# Patient Record
Sex: MALE | Race: WHITE | NOT HISPANIC OR LATINO | Employment: FULL TIME | URBAN - METROPOLITAN AREA
[De-identification: names, ages, dates, MRNs, and addresses within clinical notes are randomized per-mention and may not be internally consistent; named-entity substitution may affect disease eponyms.]

---

## 2019-02-19 PROBLEM — R07.81 RIB PAIN: Status: ACTIVE | Noted: 2019-02-19

## 2019-02-19 PROBLEM — M54.6 THORACIC SPINE PAIN: Status: ACTIVE | Noted: 2019-02-19

## 2020-01-03 PROBLEM — D84.9 IMMUNOCOMPROMISED (HCC): Status: ACTIVE | Noted: 2018-05-09

## 2020-01-03 PROBLEM — C90.01 MULTIPLE MYELOMA IN REMISSION (HCC): Status: ACTIVE | Noted: 2020-01-03

## 2020-01-03 PROBLEM — D70.9 NEUTROPENIA (HCC): Status: ACTIVE | Noted: 2018-05-09

## 2020-01-03 PROBLEM — C90.00 MULTIPLE MYELOMA (HCC): Status: ACTIVE | Noted: 2018-01-16

## 2020-01-03 PROBLEM — D63.0 ANEMIA IN NEOPLASTIC DISEASE: Status: ACTIVE | Noted: 2018-05-09

## 2020-01-03 PROBLEM — Z94.81 STATUS POST AUTOLOGOUS BONE MARROW TRANSPLANT (HCC): Status: ACTIVE | Noted: 2018-07-05

## 2020-03-15 ENCOUNTER — OFFICE VISIT (OUTPATIENT)
Dept: URGENT CARE | Facility: CLINIC | Age: 70
End: 2020-03-15
Payer: MEDICARE

## 2020-03-15 ENCOUNTER — HOSPITAL ENCOUNTER (OUTPATIENT)
Facility: MEDICAL CENTER | Age: 70
End: 2020-03-15
Attending: NURSE PRACTITIONER
Payer: MEDICARE

## 2020-03-15 VITALS — TEMPERATURE: 99.1 F | OXYGEN SATURATION: 95 % | HEART RATE: 79 BPM

## 2020-03-15 DIAGNOSIS — R05.9 COUGH: ICD-10-CM

## 2020-03-15 PROCEDURE — 87633 RESP VIRUS 12-25 TARGETS: CPT

## 2020-03-15 PROCEDURE — 99214 OFFICE O/P EST MOD 30 MIN: CPT | Performed by: NURSE PRACTITIONER

## 2020-03-15 PROCEDURE — 87581 M.PNEUMON DNA AMP PROBE: CPT

## 2020-03-15 PROCEDURE — 87486 CHLMYD PNEUM DNA AMP PROBE: CPT

## 2020-03-15 PROCEDURE — 87502 INFLUENZA DNA AMP PROBE: CPT | Mod: XU

## 2020-03-15 ASSESSMENT — ENCOUNTER SYMPTOMS
CHILLS: 0
FEVER: 1
DIARRHEA: 0
SORE THROAT: 0
MYALGIAS: 1
NAUSEA: 0
WHEEZING: 0
COUGH: 1
SPUTUM PRODUCTION: 0
SHORTNESS OF BREATH: 0
VOMITING: 0

## 2020-03-15 NOTE — PROGRESS NOTES
Subjective:   Mathew Alexis is a 70 y.o. male who presents for Cough (ISO traveled to [place]) and Fever        Cough   This is a new problem. The current episode started in the past 7 days (Started 2 days ago). The problem has been unchanged. The cough is non-productive. Associated symptoms include a fever, myalgias, nasal congestion and postnasal drip. Pertinent negatives include no chest pain, chills, rash, sore throat, shortness of breath or wheezing. He has tried OTC cough suppressant for the symptoms. The treatment provided mild relief. There is no history of asthma or pneumonia.    Recently returned from [place] 2 days ago. Symptoms started 2 days ago.  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    Review of Systems   Constitutional: Positive for fever and malaise/fatigue. Negative for chills.   HENT: Positive for congestion and postnasal drip. Negative for sore throat.    Respiratory: Positive for cough. Negative for sputum production, shortness of breath and wheezing.    Cardiovascular: Negative for chest pain.   Gastrointestinal: Negative for diarrhea, nausea and vomiting.   Musculoskeletal: Positive for myalgias.   Skin: Negative for rash.     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 He also has no past medical history of Addisons disease ([facility]), Adrenal disorder ([facility]), Allergy, Anxiety, Arrhythmia, Arthritis, Asthma, Blood transfusion without reported diagnosis, Cataract, Clotting disorder ([facility]), COPD (chronic obstructive pulmonary disease) ([facility]), Cushings syndrome ([facility]), Depression, Diabetes ([facility]), Diabetic neuropathy ([facility]), GERD (gastroesophageal reflux disease), Glaucoma, Goiter, Head ache, Heart attack ([facility]), Heart murmur, HIV (human immunodeficiency virus infection) ([facility]), Hyperlipidemia, Hypertension, IBD (inflammatory bowel disease), Kidney disease, Meningitis, Migraine, Muscle disorder, Osteoporosis,  Parathyroid disorder (HCC), Pituitary disease (HCC), Pulmonary emphysema (HCC), Seizure (HCC), Sickle cell disease (HCC), Stroke (HCC), Substance abuse (HCC), Thyroid disease, Tuberculosis, or Urinary tract infection.  ALLERGIES: No Known Allergies    Patient's PMH, SocHx, SurgHx, FamHx, Drug allergies and medications reviewed.     Objective:   Pulse 79   Temp 37.3 °C (99.1 °F) (Oral)   SpO2 95%   Physical Exam    Physical Exam  __Vitals signs reviewed.   ____Constitutional:       Appearance: He is well-developed.   ___HENT:      Head: Normocephalic.      Nose: Nose normal.      Mouth/Throat:      Lips: Pink.      Mouth: Mucous membranes are moist.   ___Eyes:      General: Lids are normal.      Extraocular Movements: Extraocular movements intact.      Conjunctiva/sclera: Conjunctivae normal.   ___Neck:      Musculoskeletal: Normal range of motion.   ___Cardiovascular:      Rate and Rhythm: Normal rate and regular rhythm.      Heart sounds: Normal heart sounds.   ___Pulmonary:      Effort: Pulmonary effort is normal. No tachypnea, bradypnea, accessory muscle usage, prolonged expiration or respiratory distress.      Breath sounds: Normal breath sounds. No wheezing.   Skin:     General: Skin is warm.   __Neurological:      Mental Status: He is alert and oriented to person, place, and time.   ___Psychiatric:         Mood and Affect: Mood normal.         Speech: Speech normal.         Behavior: Behavior normal. Behavior is cooperative.         Thought Content: Thought content normal.         Judgment: Judgment normal.  Assessment/Plan:   Assessment    1. Cough  POCT Influenza A/B    Influenza A/B By PCR (Adult - Flu Only)     Flu negative - patient notified of results  Testing and exam were performed outside from motor vehicle due to positive travel history.  Patient advised to self quarantine at home until [facility] has contacted and cleared patient.  Strict ER precautions  discussed    Differential diagnosis, natural history, supportive care, and indications for immediate follow-up discussed.     **Please note that all invasive procedures during this visit were performed by myself and/or the Medical Assistant under the supervision of the PA or MD in office**

## 2020-03-16 LAB
C PNEUM DNA SPEC QL NAA+PROBE: NOT DETECTED
FLUAV H1 2009 PAND RNA SPEC QL NAA+PROBE: NOT DETECTED
FLUAV H1 RNA SPEC QL NAA+PROBE: NOT DETECTED
FLUAV H3 RNA SPEC QL NAA+PROBE: NOT DETECTED
FLUAV RNA SPEC QL NAA+PROBE: NEGATIVE
FLUAV RNA SPEC QL NAA+PROBE: NOT DETECTED
FLUBV RNA SPEC QL NAA+PROBE: NEGATIVE
FLUBV RNA SPEC QL NAA+PROBE: NOT DETECTED
HADV DNA SPEC QL NAA+PROBE: NOT DETECTED
HCOV RNA SPEC QL NAA+PROBE: NOT DETECTED
HMPV RNA SPEC QL NAA+PROBE: NOT DETECTED
HPIV1 RNA SPEC QL NAA+PROBE: NOT DETECTED
HPIV2 RNA SPEC QL NAA+PROBE: NOT DETECTED
HPIV3 RNA SPEC QL NAA+PROBE: NOT DETECTED
HPIV4 RNA SPEC QL NAA+PROBE: NOT DETECTED
M PNEUMO DNA SPEC QL NAA+PROBE: NOT DETECTED
RSV A RNA SPEC QL NAA+PROBE: NOT DETECTED
RSV B RNA SPEC QL NAA+PROBE: NOT DETECTED
RV+EV RNA SPEC QL NAA+PROBE: DETECTED

## 2020-03-17 ENCOUNTER — TELEPHONE (OUTPATIENT)
Dept: URGENT CARE | Facility: CLINIC | Age: 70
End: 2020-03-17

## 2020-03-17 NOTE — TELEPHONE ENCOUNTER
Patient is returning your call. I asked him to keep his phone in hand so he can answer your call. He has more questions

## 2020-03-17 NOTE — TELEPHONE ENCOUNTER
Called and left detailed voicemail with positive Rhinovirus/Enterovirus results - viral infection. Advised that quarantine has been lifted due to positive results. Advised to return phone call for confirmation that he has received this message.   Good

## 2020-03-19 ENCOUNTER — TELEPHONE (OUTPATIENT)
Dept: URGENT CARE | Facility: CLINIC | Age: 70
End: 2020-03-19

## 2020-03-19 NOTE — TELEPHONE ENCOUNTER
Called and spoke to patient regarding and addressed his concerns at this time. Discussed that make returned to work once cough and fever have resolved.

## 2020-03-22 ENCOUNTER — OFFICE VISIT (OUTPATIENT)
Dept: URGENT CARE | Facility: CLINIC | Age: 70
End: 2020-03-22
Payer: MEDICARE

## 2020-03-22 VITALS
HEIGHT: 66 IN | RESPIRATION RATE: 18 BRPM | DIASTOLIC BLOOD PRESSURE: 60 MMHG | TEMPERATURE: 98.1 F | WEIGHT: 150.6 LBS | HEART RATE: 67 BPM | BODY MASS INDEX: 24.2 KG/M2 | OXYGEN SATURATION: 95 % | SYSTOLIC BLOOD PRESSURE: 116 MMHG

## 2020-03-22 DIAGNOSIS — J06.9 VIRAL URI WITH COUGH: ICD-10-CM

## 2020-03-22 PROCEDURE — 99213 OFFICE O/P EST LOW 20 MIN: CPT | Performed by: NURSE PRACTITIONER

## 2020-03-22 ASSESSMENT — ENCOUNTER SYMPTOMS
SPUTUM PRODUCTION: 1
FEVER: 0
CHILLS: 0
COUGH: 1
SHORTNESS OF BREATH: 1

## 2020-03-22 NOTE — PROGRESS NOTES
Subjective:      Mathew Alexis is a 70 y.o. male who presents with Cough (x1wk, productive cough)    Past Medical History:   Diagnosis Date   • Anemia in neoplastic disease 5/9/2018   • Cancer (HCC)    • No known health problems      Social History     Socioeconomic History   • Marital status:      Spouse name: Not on file   • Number of children: Not on file   • Years of education: Not on file   • Highest education level: Not on file   Occupational History   • Not on file   Social Needs   • Financial resource strain: Not on file   • Food insecurity     Worry: Not on file     Inability: Not on file   • Transportation needs     Medical: Not on file     Non-medical: Not on file   Tobacco Use   • Smoking status: Never Smoker   • Smokeless tobacco: Never Used   Substance and Sexual Activity   • Alcohol use: Yes   • Drug use: Never   • Sexual activity: Not on file   Lifestyle   • Physical activity     Days per week: Not on file     Minutes per session: Not on file   • Stress: Not on file   Relationships   • Social connections     Talks on phone: Not on file     Gets together: Not on file     Attends [employer]ism service: Not on file     Active member of club or organization: Not on file     Attends meetings of clubs or organizations: Not on file     Relationship status: Not on file   • Intimate partner violence     Fear of current or ex partner: Not on file     Emotionally abused: Not on file     Physically abused: Not on file     Forced sexual activity: Not on file   Other Topics Concern   • Not on file   Social History Narrative   • Not on file     Family History   Problem Relation Age of Onset   • Cancer Father      Patient has no known allergies.    Patient comes into the office with complaint of continued cough.  Total days elapsed since onset of symptoms is 9 days.  Patient was seen by urgent care on 3/15.  Nasal swab PCR done at that time was positive for enterovirus.  Patient contacted the provider who saw  "him at his original office visit and advised her that he is continuing to have an intense productive cough, and patient was advised to come back to urgent care for reevaluation.  Patient denies fever, body aches, or chills.  No shortness of breath.  States cough has been intermittently productive.  Overall, states he is feeling well.  States he is eating and drinking well and has a good appetite.  He is supposed to go back to work on Tuesday, March 24 and is concerned that he may not be able to return on that date.        Cough   This is a new problem. The current episode started 1 to 4 weeks ago. The problem has been unchanged. The problem occurs every few minutes. The cough is productive of sputum. Associated symptoms include nasal congestion, postnasal drip and shortness of breath. Pertinent negatives include no chills or fever. Nothing aggravates the symptoms. He has tried nothing for the symptoms. The treatment provided no relief.       Review of Systems   Constitutional: Positive for malaise/fatigue. Negative for chills and fever.   HENT: Positive for postnasal drip.    Respiratory: Positive for cough, sputum production and shortness of breath.    Skin: Negative.    All other systems reviewed and are negative.         Objective:     /60 (BP Location: Left arm, Patient Position: Sitting, BP Cuff Size: Adult)   Pulse 67   Temp 36.7 °C (98.1 °F) (Temporal)   Resp 18   Ht 1.676 m (5' 6\")   Wt 68.3 kg (150 lb 9.6 oz)   SpO2 95%   BMI 24.31 kg/m²      Physical Exam  Vitals signs reviewed.   Constitutional:       Appearance: Normal appearance.   HENT:      Right Ear: Tympanic membrane, ear canal and external ear normal.      Left Ear: Tympanic membrane, ear canal and external ear normal.      Nose: Congestion present.      Mouth/Throat:      Mouth: Mucous membranes are moist.   Eyes:      Extraocular Movements: Extraocular movements intact.      Conjunctiva/sclera: Conjunctivae normal.      Pupils: " Pupils are equal, round, and reactive to light.   Neck:      Musculoskeletal: Normal range of motion and neck supple.   Cardiovascular:      Rate and Rhythm: Normal rate and regular rhythm.      Heart sounds: Normal heart sounds.   Pulmonary:      Effort: Pulmonary effort is normal. No respiratory distress.      Breath sounds: Normal breath sounds. No stridor. No wheezing, rhonchi or rales.   Chest:      Chest wall: No tenderness.   Musculoskeletal: Normal range of motion.   Skin:     General: Skin is warm and dry.      Capillary Refill: Capillary refill takes less than 2 seconds.   Neurological:      Mental Status: He is alert and oriented to person, place, and time.   Psychiatric:         Mood and Affect: Mood normal.         Behavior: Behavior normal.         Thought Content: Thought content normal.         Judgment: Judgment normal.       Patient declined chest x-ray at this time, also declines prescription for cough medication or inhaler.          Assessment/Plan:   Upper respiratory infection  Cough    Over-the-counter cough medication of choice  Return to urgent care for persistent or worsening of symptoms, or any further questions or concerns  Patient advised to refrain from going back to work until symptoms have resolved x72 hours.    There are no diagnoses linked to this encounter.

## 2020-07-28 ENCOUNTER — HOSPITAL ENCOUNTER (OUTPATIENT)
Facility: MEDICAL CENTER | Age: 70
End: 2020-07-29
Attending: EMERGENCY MEDICINE | Admitting: HOSPITALIST
Payer: MEDICARE

## 2020-07-28 ENCOUNTER — APPOINTMENT (OUTPATIENT)
Dept: RADIOLOGY | Facility: MEDICAL CENTER | Age: 70
End: 2020-07-28
Attending: EMERGENCY MEDICINE
Payer: MEDICARE

## 2020-07-28 DIAGNOSIS — R74.01 TRANSAMINITIS: ICD-10-CM

## 2020-07-28 DIAGNOSIS — R10.9 ABDOMINAL PAIN, UNSPECIFIED ABDOMINAL LOCATION: ICD-10-CM

## 2020-07-28 DIAGNOSIS — D72.819 LEUKOPENIA, UNSPECIFIED TYPE: ICD-10-CM

## 2020-07-28 DIAGNOSIS — C90.00 MULTIPLE MYELOMA NOT HAVING ACHIEVED REMISSION (HCC): ICD-10-CM

## 2020-07-28 PROBLEM — K81.9 CHOLECYSTITIS: Status: ACTIVE | Noted: 2020-07-28

## 2020-07-28 PROBLEM — I10 HYPERTENSION: Status: ACTIVE | Noted: 2020-07-28

## 2020-07-28 LAB
ALBUMIN SERPL BCP-MCNC: 4.4 G/DL (ref 3.2–4.9)
ALBUMIN/GLOB SERPL: 1.7 G/DL
ALP SERPL-CCNC: 58 U/L (ref 30–99)
ALT SERPL-CCNC: 55 U/L (ref 2–50)
ANION GAP SERPL CALC-SCNC: 14 MMOL/L (ref 7–16)
APPEARANCE UR: CLEAR
AST SERPL-CCNC: 64 U/L (ref 12–45)
BACTERIA #/AREA URNS HPF: ABNORMAL /HPF
BASOPHILS # BLD AUTO: 0 % (ref 0–1.8)
BASOPHILS # BLD: 0 K/UL (ref 0–0.12)
BILIRUB SERPL-MCNC: 1.4 MG/DL (ref 0.1–1.5)
BILIRUB UR QL STRIP.AUTO: NEGATIVE
BUN SERPL-MCNC: 9 MG/DL (ref 8–22)
CALCIUM SERPL-MCNC: 9 MG/DL (ref 8.5–10.5)
CHLORIDE SERPL-SCNC: 94 MMOL/L (ref 96–112)
CO2 SERPL-SCNC: 24 MMOL/L (ref 20–33)
COLOR UR: YELLOW
COVID ORDER STATUS COVID19: NORMAL
CREAT SERPL-MCNC: 0.77 MG/DL (ref 0.5–1.4)
EOSINOPHIL # BLD AUTO: 0.06 K/UL (ref 0–0.51)
EOSINOPHIL NFR BLD: 3.5 % (ref 0–6.9)
EPI CELLS #/AREA URNS HPF: ABNORMAL /HPF
ERYTHROCYTE [DISTWIDTH] IN BLOOD BY AUTOMATED COUNT: 45.9 FL (ref 35.9–50)
GLOBULIN SER CALC-MCNC: 2.6 G/DL (ref 1.9–3.5)
GLUCOSE SERPL-MCNC: 115 MG/DL (ref 65–99)
GLUCOSE UR STRIP.AUTO-MCNC: NEGATIVE MG/DL
HCT VFR BLD AUTO: 44 % (ref 42–52)
HGB BLD-MCNC: 15.1 G/DL (ref 14–18)
HYALINE CASTS #/AREA URNS LPF: ABNORMAL /LPF
KETONES UR STRIP.AUTO-MCNC: 40 MG/DL
LEUKOCYTE ESTERASE UR QL STRIP.AUTO: NEGATIVE
LYMPHOCYTES # BLD AUTO: 0.26 K/UL (ref 1–4.8)
LYMPHOCYTES NFR BLD: 16.5 % (ref 22–41)
MANUAL DIFF BLD: ABNORMAL
MCH RBC QN AUTO: 32.5 PG (ref 27–33)
MCHC RBC AUTO-ENTMCNC: 34.3 G/DL (ref 33.7–35.3)
MCV RBC AUTO: 94.8 FL (ref 81.4–97.8)
MICRO URNS: ABNORMAL
MONOCYTES # BLD AUTO: 0.14 K/UL (ref 0–0.85)
MONOCYTES NFR BLD AUTO: 8.7 % (ref 0–13.4)
MORPHOLOGY BLD-IMP: NORMAL
MUCOUS THREADS #/AREA URNS HPF: ABNORMAL /HPF
NEUTROPHILS # BLD AUTO: 1.14 K/UL (ref 1.82–7.42)
NEUTROPHILS NFR BLD: 60.9 % (ref 44–72)
NEUTS BAND NFR BLD MANUAL: 10.4 % (ref 0–10)
NITRITE UR QL STRIP.AUTO: NEGATIVE
NRBC # BLD AUTO: 0 K/UL
NRBC BLD-RTO: 0 /100 WBC
PH UR STRIP.AUTO: 5.5 [PH] (ref 5–8)
PLATELET # BLD AUTO: 63 K/UL (ref 164–446)
PLATELET BLD QL SMEAR: NORMAL
PMV BLD AUTO: 9.8 FL (ref 9–12.9)
POTASSIUM SERPL-SCNC: 3.9 MMOL/L (ref 3.6–5.5)
PROT SERPL-MCNC: 7 G/DL (ref 6–8.2)
PROT UR QL STRIP: 100 MG/DL
RBC # BLD AUTO: 4.64 M/UL (ref 4.7–6.1)
RBC # URNS HPF: ABNORMAL /HPF
RBC BLD AUTO: NORMAL
RBC UR QL AUTO: ABNORMAL
SARS-COV-2 RDRP RESP QL NAA+PROBE: NOTDETECTED
SODIUM SERPL-SCNC: 132 MMOL/L (ref 135–145)
SP GR UR STRIP.AUTO: 1.02
SPECIMEN SOURCE: NORMAL
UROBILINOGEN UR STRIP.AUTO-MCNC: 0.2 MG/DL
WBC # BLD AUTO: 1.6 K/UL (ref 4.8–10.8)
WBC #/AREA URNS HPF: ABNORMAL /HPF

## 2020-07-28 PROCEDURE — 81001 URINALYSIS AUTO W/SCOPE: CPT

## 2020-07-28 PROCEDURE — 96365 THER/PROPH/DIAG IV INF INIT: CPT

## 2020-07-28 PROCEDURE — 85027 COMPLETE CBC AUTOMATED: CPT

## 2020-07-28 PROCEDURE — 700111 HCHG RX REV CODE 636 W/ 250 OVERRIDE (IP): Performed by: STUDENT IN AN ORGANIZED HEALTH CARE EDUCATION/TRAINING PROGRAM

## 2020-07-28 PROCEDURE — A9270 NON-COVERED ITEM OR SERVICE: HCPCS | Performed by: STUDENT IN AN ORGANIZED HEALTH CARE EDUCATION/TRAINING PROGRAM

## 2020-07-28 PROCEDURE — 700117 HCHG RX CONTRAST REV CODE 255: Performed by: EMERGENCY MEDICINE

## 2020-07-28 PROCEDURE — 96375 TX/PRO/DX INJ NEW DRUG ADDON: CPT | Mod: XU

## 2020-07-28 PROCEDURE — 99285 EMERGENCY DEPT VISIT HI MDM: CPT

## 2020-07-28 PROCEDURE — 700105 HCHG RX REV CODE 258: Performed by: STUDENT IN AN ORGANIZED HEALTH CARE EDUCATION/TRAINING PROGRAM

## 2020-07-28 PROCEDURE — 74177 CT ABD & PELVIS W/CONTRAST: CPT

## 2020-07-28 PROCEDURE — 80053 COMPREHEN METABOLIC PANEL: CPT

## 2020-07-28 PROCEDURE — 76705 ECHO EXAM OF ABDOMEN: CPT

## 2020-07-28 PROCEDURE — G0378 HOSPITAL OBSERVATION PER HR: HCPCS

## 2020-07-28 PROCEDURE — 700111 HCHG RX REV CODE 636 W/ 250 OVERRIDE (IP): Performed by: EMERGENCY MEDICINE

## 2020-07-28 PROCEDURE — 700102 HCHG RX REV CODE 250 W/ 637 OVERRIDE(OP): Performed by: STUDENT IN AN ORGANIZED HEALTH CARE EDUCATION/TRAINING PROGRAM

## 2020-07-28 PROCEDURE — 99220 PR INITIAL OBSERVATION CARE,LEVL III: CPT | Mod: GC | Performed by: HOSPITALIST

## 2020-07-28 PROCEDURE — C9803 HOPD COVID-19 SPEC COLLECT: HCPCS | Performed by: STUDENT IN AN ORGANIZED HEALTH CARE EDUCATION/TRAINING PROGRAM

## 2020-07-28 PROCEDURE — 96367 TX/PROPH/DG ADDL SEQ IV INF: CPT

## 2020-07-28 PROCEDURE — U0004 COV-19 TEST NON-CDC HGH THRU: HCPCS

## 2020-07-28 PROCEDURE — A9537 TC99M MEBROFENIN: HCPCS

## 2020-07-28 PROCEDURE — 96366 THER/PROPH/DIAG IV INF ADDON: CPT

## 2020-07-28 PROCEDURE — 85007 BL SMEAR W/DIFF WBC COUNT: CPT

## 2020-07-28 PROCEDURE — 700105 HCHG RX REV CODE 258: Performed by: EMERGENCY MEDICINE

## 2020-07-28 PROCEDURE — 96365 THER/PROPH/DIAG IV INF INIT: CPT | Mod: XU

## 2020-07-28 RX ORDER — BISACODYL 10 MG
10 SUPPOSITORY, RECTAL RECTAL
Status: DISCONTINUED | OUTPATIENT
Start: 2020-07-28 | End: 2020-07-29

## 2020-07-28 RX ORDER — SODIUM CHLORIDE 9 MG/ML
INJECTION, SOLUTION INTRAVENOUS CONTINUOUS
Status: DISCONTINUED | OUTPATIENT
Start: 2020-07-28 | End: 2020-07-29 | Stop reason: HOSPADM

## 2020-07-28 RX ORDER — LIDOCAINE HYDROCHLORIDE 10 MG/ML
20 INJECTION, SOLUTION INFILTRATION; PERINEURAL ONCE
Status: DISCONTINUED | OUTPATIENT
Start: 2020-07-28 | End: 2020-07-28 | Stop reason: CLARIF

## 2020-07-28 RX ORDER — ACETAMINOPHEN 325 MG/1
650 TABLET ORAL EVERY 6 HOURS PRN
Status: DISCONTINUED | OUTPATIENT
Start: 2020-07-28 | End: 2020-07-29

## 2020-07-28 RX ORDER — AMOXICILLIN 250 MG
2 CAPSULE ORAL 2 TIMES DAILY
Status: DISCONTINUED | OUTPATIENT
Start: 2020-07-28 | End: 2020-07-29

## 2020-07-28 RX ORDER — POLYETHYLENE GLYCOL 3350 17 G/17G
1 POWDER, FOR SOLUTION ORAL
Status: DISCONTINUED | OUTPATIENT
Start: 2020-07-28 | End: 2020-07-29

## 2020-07-28 RX ORDER — MORPHINE SULFATE 4 MG/ML
4 INJECTION, SOLUTION INTRAMUSCULAR; INTRAVENOUS ONCE
Status: COMPLETED | OUTPATIENT
Start: 2020-07-28 | End: 2020-07-28

## 2020-07-28 RX ORDER — SODIUM CHLORIDE, SODIUM LACTATE, POTASSIUM CHLORIDE, CALCIUM CHLORIDE 600; 310; 30; 20 MG/100ML; MG/100ML; MG/100ML; MG/100ML
1000 INJECTION, SOLUTION INTRAVENOUS ONCE
Status: COMPLETED | OUTPATIENT
Start: 2020-07-28 | End: 2020-07-28

## 2020-07-28 RX ORDER — SODIUM CHLORIDE 9 MG/ML
1000 INJECTION, SOLUTION INTRAVENOUS ONCE
Status: COMPLETED | OUTPATIENT
Start: 2020-07-28 | End: 2020-07-28

## 2020-07-28 RX ORDER — OXYCODONE HYDROCHLORIDE 5 MG/1
5 TABLET ORAL EVERY 4 HOURS PRN
Status: DISCONTINUED | OUTPATIENT
Start: 2020-07-28 | End: 2020-07-29

## 2020-07-28 RX ORDER — SODIUM CHLORIDE, SODIUM LACTATE, POTASSIUM CHLORIDE, CALCIUM CHLORIDE 600; 310; 30; 20 MG/100ML; MG/100ML; MG/100ML; MG/100ML
INJECTION, SOLUTION INTRAVENOUS CONTINUOUS
Status: DISCONTINUED | OUTPATIENT
Start: 2020-07-28 | End: 2020-07-28

## 2020-07-28 RX ADMIN — ACETAMINOPHEN 650 MG: 325 TABLET, FILM COATED ORAL at 17:50

## 2020-07-28 RX ADMIN — PIPERACILLIN AND TAZOBACTAM 4.5 G: 4; .5 INJECTION, POWDER, LYOPHILIZED, FOR SOLUTION INTRAVENOUS; PARENTERAL at 21:45

## 2020-07-28 RX ADMIN — MORPHINE SULFATE 4 MG: 4 INJECTION INTRAVENOUS at 09:44

## 2020-07-28 RX ADMIN — SODIUM CHLORIDE 1000 ML: 9 INJECTION, SOLUTION INTRAVENOUS at 13:08

## 2020-07-28 RX ADMIN — IOHEXOL 100 ML: 350 INJECTION, SOLUTION INTRAVENOUS at 08:15

## 2020-07-28 RX ADMIN — SODIUM CHLORIDE: 9 INJECTION, SOLUTION INTRAVENOUS at 14:12

## 2020-07-28 RX ADMIN — OXYCODONE 5 MG: 5 TABLET ORAL at 17:50

## 2020-07-28 RX ADMIN — CEFTRIAXONE SODIUM 1 G: 1 INJECTION, POWDER, FOR SOLUTION INTRAMUSCULAR; INTRAVENOUS at 11:25

## 2020-07-28 RX ADMIN — PIPERACILLIN AND TAZOBACTAM 4.5 G: 4; .5 INJECTION, POWDER, LYOPHILIZED, FOR SOLUTION INTRAVENOUS; PARENTERAL at 14:13

## 2020-07-28 RX ADMIN — SODIUM CHLORIDE, POTASSIUM CHLORIDE, SODIUM LACTATE AND CALCIUM CHLORIDE 1000 ML: 600; 310; 30; 20 INJECTION, SOLUTION INTRAVENOUS at 06:52

## 2020-07-28 RX ADMIN — SODIUM CHLORIDE, POTASSIUM CHLORIDE, SODIUM LACTATE AND CALCIUM CHLORIDE: 600; 310; 30; 20 INJECTION, SOLUTION INTRAVENOUS at 09:44

## 2020-07-28 RX ADMIN — OXYCODONE 5 MG: 5 TABLET ORAL at 21:12

## 2020-07-28 ASSESSMENT — PATIENT HEALTH QUESTIONNAIRE - PHQ9
2. FEELING DOWN, DEPRESSED, IRRITABLE, OR HOPELESS: NOT AT ALL
1. LITTLE INTEREST OR PLEASURE IN DOING THINGS: NOT AT ALL
SUM OF ALL RESPONSES TO PHQ9 QUESTIONS 1 AND 2: 0

## 2020-07-28 ASSESSMENT — ENCOUNTER SYMPTOMS
FEVER: 1
FLANK PAIN: 1
DIARRHEA: 0
WHEEZING: 0
NAUSEA: 0
BLOOD IN STOOL: 0
DOUBLE VISION: 0
CHILLS: 1
NAUSEA: 1
TINGLING: 0
CONSTIPATION: 1
SHORTNESS OF BREATH: 0
BLURRED VISION: 0
BACK PAIN: 1
PALPITATIONS: 0
WEAKNESS: 0
ABDOMINAL PAIN: 1
DIZZINESS: 0
SENSORY CHANGE: 0
BRUISES/BLEEDS EASILY: 0
DEPRESSION: 0
NERVOUS/ANXIOUS: 0
VOMITING: 0
HEADACHES: 0
COUGH: 0
NECK PAIN: 0
DIAPHORESIS: 0
MYALGIAS: 0
FLANK PAIN: 0
ORTHOPNEA: 0
FOCAL WEAKNESS: 0
NERVOUS/ANXIOUS: 1
WEIGHT LOSS: 0
SORE THROAT: 0
POLYDIPSIA: 0

## 2020-07-28 ASSESSMENT — LIFESTYLE VARIABLES
TOTAL SCORE: 0
HAVE PEOPLE ANNOYED YOU BY CRITICIZING YOUR DRINKING: NO
EVER HAD A DRINK FIRST THING IN THE MORNING TO STEADY YOUR NERVES TO GET RID OF A HANGOVER: NO
ALCOHOL_USE: NO
AVERAGE NUMBER OF DAYS PER WEEK YOU HAVE A DRINK CONTAINING ALCOHOL: 0
TOTAL SCORE: 0
TOTAL SCORE: 0
CONSUMPTION TOTAL: NEGATIVE
HAVE YOU EVER FELT YOU SHOULD CUT DOWN ON YOUR DRINKING: NO
EVER_SMOKED: NEVER
HOW MANY TIMES IN THE PAST YEAR HAVE YOU HAD 5 OR MORE DRINKS IN A DAY: 0
EVER FELT BAD OR GUILTY ABOUT YOUR DRINKING: NO
ON A TYPICAL DAY WHEN YOU DRINK ALCOHOL HOW MANY DRINKS DO YOU HAVE: 0

## 2020-07-28 ASSESSMENT — FIBROSIS 4 INDEX: FIB4 SCORE: 9.86

## 2020-07-28 NOTE — H&P
Internal Medicine Admitting History and Physical    Note Author: Max Ledezma M.D.       Name Mathew Alexis DOB 1950   Age/Sex 70 y.o. male   MRN [id number]   Code Status FULL     After 5PM or if no immediate response to page, please call for cross-coverage  Attending/Team: Abhi Rousseau See Patient List for primary contact information  Call [phone] to page    1st Call - Day Intern (R1):   Max Ledezma 2nd Call - Day Sr. Resident (R2/R3):   Raciel Rachel       Chief Complaint:   Abdominal pain    HPI:  Acute abdominal pain of the RUQ for 3 days, intense, sharp, non-radiating, 7-8/10. Originally thought to be related to chronic constipation, but after taking senna and having bowel movement, did not feel any relief; attempted to use Aleve also with no relief. Worsened with inspirations. Yesterday, began to feel worse, with new onset subjective fever and chills. This was new pain from chronic 6m abdominal pain related to constipation. Denies nausea/vomiting, hematochezia/hematuria, flank pain, itching sensation/skin changes.    History of Multiple myeloma, 2 years ago. Stem cell transplant at [facility], follows oncology with Dorcas Covarrubias at [facility] (call placed, partner--Jean bunch).    Review of Systems   Constitutional: Positive for chills and fever. Negative for weight loss.   HENT: Negative for congestion, hearing loss, nosebleeds, sore throat and tinnitus.    Eyes: Negative for blurred vision and double vision.   Respiratory: Negative for cough, shortness of breath and wheezing.    Cardiovascular: Negative for chest pain, orthopnea and leg swelling.   Gastrointestinal: Positive for abdominal pain and constipation. Negative for blood in stool, diarrhea, nausea and vomiting.   Genitourinary: Negative for dysuria, flank pain, frequency, hematuria and urgency.   Musculoskeletal: Negative for joint pain and myalgias.   Skin: Negative for itching and rash.    Neurological: Negative for dizziness, tingling, weakness and headaches.   Endo/Heme/Allergies: Negative for polydipsia.        No unexpected weight changes.   Psychiatric/Behavioral: Negative for depression. The patient is not nervous/anxious.              Past Medical History (Chronic medical problem, known complications and current treatment)    Multiple Myeloma, HTN     Past Surgical History:  Past Surgical History:   Procedure Laterality Date   • HERNIA REPAIR     • VASECTOMY     Stem cell transplant 2018 @ [facility]    Current Outpatient Medications:  Home Medications     Reviewed by Minerva Hicks PhT (Pharmacy Tech) on 07/28/20 at 1051  Med List Status: Complete   Medication Last Dose Status   Ascorbic Acid (VITAMIN C PO) 7/25/2020 Active   aspirin EC (ECOTRIN) 81 MG Tablet Delayed Response 7/25/2020 Active   Calcium-Magnesium (LENKA/MAG PO) 7/25/2020 Active   Cholecalciferol (VITAMIN D3 PO) 7/25/2020 Active   Lenalidomide (REVLIMID) 5 MG Cap 7/25/2020 Active   multivitamin (THERAGRAN) Tab 7/25/2020 Active   TURMERIC PO 7/25/2020 Active                Medication Allergy/Sensitivities:  No Known Allergies      Family History (mandatory)   Family History   Problem Relation Age of Onset   • Cancer Father    Father: Renal CA  Brother: arrythmia, unspecified  Sister: mitral valve replacement x2    Social History (mandatory)   Social History     Socioeconomic History   • Marital status:      Spouse name: Not on file   • Number of children: Not on file   • Years of education: Not on file   • Highest education level: Not on file   Occupational History   • Not on file   Social Needs   • Financial resource strain: Not on file   • Food insecurity     Worry: Not on file     Inability: Not on file   • Transportation needs     Medical: Not on file     Non-medical: Not on file   Tobacco Use   • Smoking status: Never Smoker   • Smokeless tobacco: Never Used   Substance and Sexual Activity   • Alcohol use: Yes   •  Drug use: Never   • Sexual activity: Not on file   Lifestyle   • Physical activity     Days per week: Not on file     Minutes per session: Not on file   • Stress: Not on file   Relationships   • Social connections     Talks on phone: Not on file     Gets together: Not on file     Attends Tenriism service: Not on file     Active member of club or organization: Not on file     Attends meetings of clubs or organizations: Not on file     Relationship status: Not on file   • Intimate partner violence     Fear of current or ex partner: Not on file     Emotionally abused: Not on file     Physically abused: Not on file     Forced sexual activity: Not on file   Other Topics Concern   • Not on file   Social History Narrative   • Not on file     Monthly MJ usage, 1-2beers/month, never smoker.    Living situation: [place] home with son; Apartment with girlfriend (preferred placement for discharge)  PCP : YVONNE Strauss    Physical Exam     Vitals:    07/28/20 0927 07/28/20 1050 07/28/20 1101 07/28/20 1201   BP: 132/71 137/68 120/57 127/66   Pulse: 78 67 67 76   Resp: 18      Temp:       TempSrc:       SpO2: 95% 97% 95% 95%   Weight:         Body mass index is 23.84 kg/m².  O2 therapy: Pulse Oximetry: 95 %, O2 Delivery Device: None - Room Air    Physical Exam   Constitutional: He is oriented to person, place, and time and well-developed, well-nourished, and in no distress.   HENT:   Head: Normocephalic and atraumatic.   Eyes: Pupils are equal, round, and reactive to light. EOM are normal.   Neck: Normal range of motion.   Cardiovascular: Normal rate and regular rhythm. Exam reveals no gallop and no friction rub.   No murmur heard.  Dorsalis, radial pulses 3+ bilaterally   Pulmonary/Chest: Breath sounds normal.   Abdominal:   Given morphine prior to abd exam.  Soft, tender in the RUQ, positive turcios. No rebound tenderness.   Musculoskeletal: Normal range of motion.      Comments: Shoulder flexion/extension 5/5  bilaterally. Knee flexion/extension 5/5 bilaterally.   Neurological: He is oriented to person, place, and time. He displays normal reflexes. No cranial nerve deficit.   Skin: Skin is warm and dry. He is not diaphoretic.   Psychiatric: Memory and judgment normal.         Data Review       Old Records Request:   Completed  Current Records review/summary: Completed    Lab Data Review:  Recent Results (from the past 24 hour(s))   CBC WITH DIFFERENTIAL    Collection Time: 07/28/20  6:29 AM   Result Value Ref Range    WBC 1.6 (LL) 4.8 - 10.8 K/uL    RBC 4.64 (L) 4.70 - 6.10 M/uL    Hemoglobin 15.1 14.0 - 18.0 g/dL    Hematocrit 44.0 42.0 - 52.0 %    MCV 94.8 81.4 - 97.8 fL    MCH 32.5 27.0 - 33.0 pg    MCHC 34.3 33.7 - 35.3 g/dL    RDW 45.9 35.9 - 50.0 fL    Platelet Count 63 (L) 164 - 446 K/uL    MPV 9.8 9.0 - 12.9 fL    Neutrophils-Polys 60.90 44.00 - 72.00 %    Lymphocytes 16.50 (L) 22.00 - 41.00 %    Monocytes 8.70 0.00 - 13.40 %    Eosinophils 3.50 0.00 - 6.90 %    Basophils 0.00 0.00 - 1.80 %    Nucleated RBC 0.00 /100 WBC    Neutrophils (Absolute) 1.14 (L) 1.82 - 7.42 K/uL    Lymphs (Absolute) 0.26 (L) 1.00 - 4.80 K/uL    Monos (Absolute) 0.14 0.00 - 0.85 K/uL    Eos (Absolute) 0.06 0.00 - 0.51 K/uL    Baso (Absolute) 0.00 0.00 - 0.12 K/uL    NRBC (Absolute) 0.00 K/uL   COMP METABOLIC PANEL    Collection Time: 07/28/20  6:29 AM   Result Value Ref Range    Sodium 132 (L) 135 - 145 mmol/L    Potassium 3.9 3.6 - 5.5 mmol/L    Chloride 94 (L) 96 - 112 mmol/L    Co2 24 20 - 33 mmol/L    Anion Gap 14.0 7.0 - 16.0    Glucose 115 (H) 65 - 99 mg/dL    Bun 9 8 - 22 mg/dL    Creatinine 0.77 0.50 - 1.40 mg/dL    Calcium 9.0 8.5 - 10.5 mg/dL    AST(SGOT) 64 (H) 12 - 45 U/L    ALT(SGPT) 55 (H) 2 - 50 U/L    Alkaline Phosphatase 58 30 - 99 U/L    Total Bilirubin 1.4 0.1 - 1.5 mg/dL    Albumin 4.4 3.2 - 4.9 g/dL    Total Protein 7.0 6.0 - 8.2 g/dL    Globulin 2.6 1.9 - 3.5 g/dL    A-G Ratio 1.7 g/dL   ESTIMATED GFR    Collection  Time: 07/28/20  6:29 AM   Result Value Ref Range    GFR If African American >60 >60 mL/min/1.73 m 2    GFR If Non African American >60 >60 mL/min/1.73 m 2   PERIPHERAL SMEAR REVIEW    Collection Time: 07/28/20  6:29 AM   Result Value Ref Range    Peripheral Smear Review see below    PLATELET ESTIMATE    Collection Time: 07/28/20  6:29 AM   Result Value Ref Range    Plt Estimation Decreased    MORPHOLOGY    Collection Time: 07/28/20  6:29 AM   Result Value Ref Range    RBC Morphology Normal    DIFFERENTIAL MANUAL    Collection Time: 07/28/20  6:29 AM   Result Value Ref Range    Bands-Stabs 10.40 (H) 0.00 - 10.00 %    Manual Diff Status PERFORMED    URINALYSIS CULTURE, IF INDICATED    Collection Time: 07/28/20  8:20 AM    Specimen: Urine, Clean Catch   Result Value Ref Range    Color Yellow     Character Clear     Specific Gravity 1.018 <1.035    Ph 5.5 5.0 - 8.0    Glucose Negative Negative mg/dL    Ketones 40 (A) Negative mg/dL    Protein 100 (A) Negative mg/dL    Bilirubin Negative Negative    Urobilinogen, Urine 0.2 Negative    Nitrite Negative Negative    Leukocyte Esterase Negative Negative    Occult Blood Small (A) Negative    Micro Urine Req Microscopic    URINE MICROSCOPIC (W/UA)    Collection Time: 07/28/20  8:20 AM   Result Value Ref Range    WBC 2-5 (A) /hpf    RBC 0-2 (A) /hpf    Bacteria Few (A) None /hpf    Epithelial Cells Few /hpf    Mucous Threads Few /hpf    Hyaline Cast 0-2 /lpf       Imaging/Procedures Review:    Independant Imaging Review: Completed  US-RUQ   Final Result      Gallbladder wall thickening can be seen in cholecystitis, hepatitis, cirrhosis and hypoproteinemia. There is trace pericholecystic fluid. No gallstones are identified.      No biliary ductal dilatation is seen.      Hepatic cyst.      Atherosclerotic plaque.      CT-ABDOMEN-PELVIS WITH   Final Result      Gallbladder wall thickening with pericholecystic fluid. There are inflammatory changes in the right upper quadrant.  Findings appear compatible with cholecystitis. Further evaluation can be obtained with right upper quadrant ultrasound.      Hypodense hepatic lesion likely represents a cyst. Tiny hypodense lesion in segment 4 is too small to characterize but may also represent a small cyst.      Moderate amount of colonic stool.      No evidence of acute appendicitis.      Left renal cyst.      Multiple compression fractures in the thoracic and lumbar spine.      Atherosclerotic plaque.      NM-BILIARY (HIDA) SCAN WITH CCK    (Results Pending)            EKG:   EKG Independent Review: Deferred; not indicated at this time    Records reviewed and summarized in current documentation :  Yes  [employer] teaching service handout given to patient:  No         Assessment/Plan     Cholecystitis  Assessment & Plan  Acute cholecystitis without calculus, 3 day history of increased abd pain, nausea. Presented to ED for abd pain and was found to have pericholecystic fluid, concerning for cholecystitis. Surgery consulted for cholecystectomy, who in turn requested oncology consult request regarding immunosuppressive state. Will likely undergo operation tomorrow.  -surgery following  -Oncology consulted, will see tomorrow  -HIDA scan  -NPO  -isopropyl pads for nausea  -Acetaminophen 650mg PRN q6h  -oxyco 5mg PRN q6h    Hypertension  Assessment & Plan  History of hypertension. Normotensive without medications. Normotensive at admission.  -VS q8h    Multiple myeloma in remission (HCC)- (present on admission)  Assessment & Plan  History of MM, post stem cell transplant. Has been on lenalidomide. Call placed to home oncology office--Elver CARDENAS, discussed potential secondary neoplastic processes due to lenalidomide, unlikely to be cause of current cholecysitis.  -Hold lenalidomide, may restart at discharge  -oncology consulted  -Follow up with Elver CARDENAS, for lenalidomide restart      Anticipated Hospital stay: Observation admit        Quality  Measures  Quality-Core Measures   Reviewed items::  Medications reviewed, Labs reviewed and Radiology images reviewed  Denny catheter::  No Denny  DVT prophylaxis - mechanical:  SCDs  Antibiotics:  Treating active infection/contamination beyond 24 hours perioperative coverage (zosyn)    PCP: YVONNE Strauss

## 2020-07-28 NOTE — ED NOTES
Tech from Lab called with critical result of WBC 1.6 at 0726. Critical lab result read back to Tech.   Dr. Hampton and primary RN notified of critical lab result at 0727.  Critical lab result read back by Dr. Hampton.

## 2020-07-28 NOTE — PROGRESS NOTES
Negative on covid test, update patient plan of care. For HIDA scan 4 pm today, kept NPO. Last meal was last night.

## 2020-07-28 NOTE — CARE PLAN
Problem: Communication  Goal: The ability to communicate needs accurately and effectively will improve  Outcome: MET     Problem: Safety  Goal: Will remain free from injury  Outcome: MET     Problem: Pain Management  Goal: Pain level will decrease to patient's comfort goal  Outcome: MET     Problem: Infection  Goal: Will remain free from infection  Outcome: MET

## 2020-07-28 NOTE — ASSESSMENT & PLAN NOTE
History of MM, post stem cell transplant. Has been on lenalidomide. Call placed to home oncology office--Elver CARDENAS, discussed potential secondary neoplastic processes due to lenalidomide, unlikely to be cause of current cholecysitis.  -Hold lenalidomide, may restart at discharge  -Follow up with Elver CARDENAS, for lenalidomide restart

## 2020-07-28 NOTE — ED PROVIDER NOTES
"ED Provider Note    ED Provider Note    Primary care provider: YVONNE Strauss  Means of arrival: POV  History obtained from: patient, wife  History limited by: None    CHIEF COMPLAINT  Chief Complaint   Patient presents with   • RLQ Pain     x 3 days, +rebound tenderness       HPI  Mathew Alexis is a 70 y.o. male who presents to the Emergency Department accompanied by his partner with a chief complaint of lower abdominal pain, primarily, right lower quadrant pain.  Patient underwent a stem cell transplant about 2 years ago.  He continues to be on maintenance therapy, REVLIMID,  for neoplastic disease.  About 6 months ago, he started developing constipation which would cause abdominal pain.  He has been able to manage this at home with Senokot tea.  He started feeling these similar symptoms over the weekend and had Senokot T on Saturday.  On Sunday, he passed over the course of the day, a large amount of stool and felt he was adequately \"cleaned out\".  However, the pain symptoms did not subside which typically they would.  He is also had associated nausea which is not typical of his symptoms he has had over the last 6 months.  He reports a fever of 102 last night at approximately 8 PM.  He denies any urinary symptoms.  No frequency or hematuria.  No blood in his stool.  He has not vomited.  No chest pain or shortness of breath.  No cough or cold symptoms.  He is having some pain to his right lateral abdominal area and right flank.     REVIEW OF SYSTEMS  Review of Systems   Constitutional: Positive for fever.   HENT: Negative for congestion.    Respiratory: Negative for cough and shortness of breath.    Cardiovascular: Negative for chest pain.   Gastrointestinal: Positive for abdominal pain, constipation and nausea. Negative for blood in stool and vomiting.   Genitourinary: Positive for flank pain. Negative for dysuria and hematuria.   Musculoskeletal: Positive for back pain.   Skin: Negative for rash. "   Neurological: Negative for dizziness and headaches.   All other systems reviewed and are negative.      PAST MEDICAL HISTORY   has a past medical history of Anemia in neoplastic disease (5/9/2018), Cancer (HCC), and No known health problems.    SURGICAL HISTORY   has a past surgical history that includes hernia repair and vasectomy.    SOCIAL HISTORY  Social History     Tobacco Use   • Smoking status: Never Smoker   • Smokeless tobacco: Never Used   Substance Use Topics   • Alcohol use: Yes   • Drug use: Never      Social History     Substance and Sexual Activity   Drug Use Never       FAMILY HISTORY  Family History   Problem Relation Age of Onset   • Cancer Father        CURRENT MEDICATIONS  Home Medications     Reviewed by Liat Blanca (Pharmacy Tech) on 07/28/20 at 1051  Med List Status: Complete   Medication Last Dose Status   Ascorbic Acid (VITAMIN C PO) 7/25/2020 Active   aspirin EC (ECOTRIN) 81 MG Tablet Delayed Response 7/25/2020 Active   Calcium-Magnesium (LENKA/MAG PO) 7/25/2020 Active   Cholecalciferol (VITAMIN D3 PO) 7/25/2020 Active   Lenalidomide (REVLIMID) 5 MG Cap 7/25/2020 Active   multivitamin (THERAGRAN) Tab 7/25/2020 Active   TURMERIC PO 7/25/2020 Active                ALLERGIES  No Known Allergies    PHYSICAL EXAM  VITAL SIGNS: /66   Pulse 76   Temp 36.1 °C (97 °F) (Temporal)   Resp 18   Wt 67 kg (147 lb 11.3 oz)   SpO2 95%   BMI 23.84 kg/m²   Vitals reviewed.  Constitutional: Patient is oriented to person, place, and time. Appears well-developed and well-nourished.  Mild distress.    Head: Normocephalic and atraumatic.   Ears: Normal external ears bilaterally.   Eyes: Conjunctivae are normal. Pupils are equal, round, and reactive to light.   Neck: Normal range of motion. Neck supple.  Cardiovascular: Normal rate, regular rhythm and normal heart sounds. Normal peripheral pulses.  Pulmonary/Chest: Effort normal and breath sounds normal. No respiratory distress, no wheezes,  rhonchi, or rales.  Abdominal: Soft. Bowel sounds are normal. There is pain across his lower abdomen, right greater than left.  No rebound or guarding, or peritoneal signs. No CVA tenderness.  Musculoskeletal: No edema and no tenderness.   Neurological: No focal deficits.   Skin: Skin is warm and dry. No erythema. No pallor.   Psychiatric: Patient has a normal mood and affect.     LABS  Results for orders placed or performed during the hospital encounter of 07/28/20   CBC WITH DIFFERENTIAL   Result Value Ref Range    WBC 1.6 (LL) 4.8 - 10.8 K/uL    RBC 4.64 (L) 4.70 - 6.10 M/uL    Hemoglobin 15.1 14.0 - 18.0 g/dL    Hematocrit 44.0 42.0 - 52.0 %    MCV 94.8 81.4 - 97.8 fL    MCH 32.5 27.0 - 33.0 pg    MCHC 34.3 33.7 - 35.3 g/dL    RDW 45.9 35.9 - 50.0 fL    Platelet Count 63 (L) 164 - 446 K/uL    MPV 9.8 9.0 - 12.9 fL    Neutrophils-Polys 60.90 44.00 - 72.00 %    Lymphocytes 16.50 (L) 22.00 - 41.00 %    Monocytes 8.70 0.00 - 13.40 %    Eosinophils 3.50 0.00 - 6.90 %    Basophils 0.00 0.00 - 1.80 %    Nucleated RBC 0.00 /100 WBC    Neutrophils (Absolute) 1.14 (L) 1.82 - 7.42 K/uL    Lymphs (Absolute) 0.26 (L) 1.00 - 4.80 K/uL    Monos (Absolute) 0.14 0.00 - 0.85 K/uL    Eos (Absolute) 0.06 0.00 - 0.51 K/uL    Baso (Absolute) 0.00 0.00 - 0.12 K/uL    NRBC (Absolute) 0.00 K/uL   COMP METABOLIC PANEL   Result Value Ref Range    Sodium 132 (L) 135 - 145 mmol/L    Potassium 3.9 3.6 - 5.5 mmol/L    Chloride 94 (L) 96 - 112 mmol/L    Co2 24 20 - 33 mmol/L    Anion Gap 14.0 7.0 - 16.0    Glucose 115 (H) 65 - 99 mg/dL    Bun 9 8 - 22 mg/dL    Creatinine 0.77 0.50 - 1.40 mg/dL    Calcium 9.0 8.5 - 10.5 mg/dL    AST(SGOT) 64 (H) 12 - 45 U/L    ALT(SGPT) 55 (H) 2 - 50 U/L    Alkaline Phosphatase 58 30 - 99 U/L    Total Bilirubin 1.4 0.1 - 1.5 mg/dL    Albumin 4.4 3.2 - 4.9 g/dL    Total Protein 7.0 6.0 - 8.2 g/dL    Globulin 2.6 1.9 - 3.5 g/dL    A-G Ratio 1.7 g/dL   URINALYSIS CULTURE, IF INDICATED    Specimen: Urine, Clean  Catch   Result Value Ref Range    Color Yellow     Character Clear     Specific Gravity 1.018 <1.035    Ph 5.5 5.0 - 8.0    Glucose Negative Negative mg/dL    Ketones 40 (A) Negative mg/dL    Protein 100 (A) Negative mg/dL    Bilirubin Negative Negative    Urobilinogen, Urine 0.2 Negative    Nitrite Negative Negative    Leukocyte Esterase Negative Negative    Occult Blood Small (A) Negative    Micro Urine Req Microscopic    ESTIMATED GFR   Result Value Ref Range    GFR If African American >60 >60 mL/min/1.73 m 2    GFR If Non African American >60 >60 mL/min/1.73 m 2   PERIPHERAL SMEAR REVIEW   Result Value Ref Range    Peripheral Smear Review see below    PLATELET ESTIMATE   Result Value Ref Range    Plt Estimation Decreased    MORPHOLOGY   Result Value Ref Range    RBC Morphology Normal    DIFFERENTIAL MANUAL   Result Value Ref Range    Bands-Stabs 10.40 (H) 0.00 - 10.00 %    Manual Diff Status PERFORMED    URINE MICROSCOPIC (W/UA)   Result Value Ref Range    WBC 2-5 (A) /hpf    RBC 0-2 (A) /hpf    Bacteria Few (A) None /hpf    Epithelial Cells Few /hpf    Mucous Threads Few /hpf    Hyaline Cast 0-2 /lpf       All labs reviewed by me.    RADIOLOGY  US-RUQ   Final Result      Gallbladder wall thickening can be seen in cholecystitis, hepatitis, cirrhosis and hypoproteinemia. There is trace pericholecystic fluid. No gallstones are identified.      No biliary ductal dilatation is seen.      Hepatic cyst.      Atherosclerotic plaque.      CT-ABDOMEN-PELVIS WITH   Final Result      Gallbladder wall thickening with pericholecystic fluid. There are inflammatory changes in the right upper quadrant. Findings appear compatible with cholecystitis. Further evaluation can be obtained with right upper quadrant ultrasound.      Hypodense hepatic lesion likely represents a cyst. Tiny hypodense lesion in segment 4 is too small to characterize but may also represent a small cyst.      Moderate amount of colonic stool.      No  "evidence of acute appendicitis.      Left renal cyst.      Multiple compression fractures in the thoracic and lumbar spine.      Atherosclerotic plaque.      NM-BILIARY (HIDA) SCAN WITH CCK    (Results Pending)     The radiologist's interpretation of all radiological studies have been reviewed by me.    COURSE & MEDICAL DECISION MAKING  Pertinent Labs & Imaging studies reviewed. (See chart for details)    Obtained and reviewed past medical records.  Patient's last encounter as an outpatient visit June of this year he was seen for his symptoms in his ear, of \"fullness\".  Diagnosed with a cerumen impaction.    6:31 AM - Patient seen and examined at bedside.  This is a pleasant 70-year-old male who presents with lower abdominal pain, right greater than left that started on Sunday is associated with nausea and a fever and has been persistent, despite treatment at home for constipation.  There is concern for colitis or appendicitis although the differential is not limited to these 2 etiologies.  I recommended further evaluation with lab testing and CT to which the patient is agreeable.  He is requesting something to drink, noting that he feels \"dehydrated\".  He is advised on an n.p.o. status and he is agreeable.  He denies pain medication or nausea medication at this time and he is encouraged to let me know if that changes.      7:40 AM notified by the nurse, that the patient had a critically low white blood cell count.  Previous values range in the last year between 2.1 and 2.5.  Chemistry shows a low sodium 132.  Potassium is normal.  Glucose elevated 115.  AST and ALT are both slightly elevated at 64 and 55 respectively.  I suspect, his neutropenia is likely related to his medication Revlimid.    0915AM patient's reevaluated at the bedside.  We discussed CT findings as well as lab results and I have recommended further evaluation with ultrasound.  He is requesting pain medication at this time and will be treated " appropriately he was quite concerned about getting opioid pain medication but will start with a small dose of morphine per his request.    10:20 AM patient reevaluated at the bedside.  He is feeling much better after receiving pain medications.  We discussed ultrasound findings which were overall, unequivocal.  We had previously discussed CT findings.  At this point, given myriad of abnormalities including leukopenia, reported fever of 102 last night, elevated LFTs with abnormal findings on CT though the ultrasound did not show gallstones and only trace pericholecystic fluid, I do suspect, that he is mounting an infection and possibly acalculus cholecystitis.  After much discussion, patient and his partner at the bedside, are agreeable to plan for admission to the hospital and surgical consultation.    1030AM discussed with Dr. Andino, general surgery who agrees to see the patient in consultation.  He requests the patient be admitted to the medicine service with an oncology consult for guidance on possible need for surgical intervention given that the patient is on immune compromising therapy, Revlimid.  He also recommends the patient have a HIDA scan.  Have also spoken with Dr. Pineda, the hospitalist who will admit the patient to their service.  He is made aware of Dr. Andino's recommendations for HIDA scan which has been ordered.  I will initiate antibiotic therapy.  He is also made aware of Dr. Andino's recommendation for oncology consultation.  I have explained, but the patient gives his oncology care at [facility] in [place] and his primary care doctor is there as well.    Patient is admitted in stable but guarded condition.    FINAL IMPRESSION  1. Abdominal pain, unspecified abdominal location    2. Leukopenia, unspecified type    3. Multiple myeloma not having achieved remission (HCC)    4. Transaminitis

## 2020-07-28 NOTE — ASSESSMENT & PLAN NOTE
Acute cholecystitis without calculus, 3 day history of increased abd pain, nausea. Presented to ED for abd pain and was found to have pericholecystic fluid, concerning for cholecystitis. Surgery consulted for cholecystectomy. HIDA scan showed slow drainage, but without blockage or paralysis. Not a surgical candidate at this time.  -surgery signed off  -isopropyl pads for nausea  -Naproxen 500mg BID

## 2020-07-28 NOTE — CONSULTS
General Surgery Consult    CHIEF COMPLAINT: Abdominal pain.     HISTORY OF PRESENT ILLNESS: The patient is a 70 y.o. male, who presents with abdominal pain.  This became so severe that he presented to the emergency department.  Here he underwent a work-up to include a CT scan showing a thickened gallbladder wall followed by an ultrasound confirming a 4 mm wall.  There were no stones noted on ultrasound.  His medical history is complicated by multiple myeloma for which he is receiving oral treatment.  His white count was 1.6 on admission.  He has not taken his medication since Saturday.  The patient has been admitted to the medicine service, and oncology consult obtained and a general surgery consult obtained.  The patient is currently comfortable and awaiting his HIDA scan.    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     PAST SURGICAL HISTORY:  has a past surgical history that includes hernia repair and vasectomy.     ALLERGIES: No Known Allergies     CURRENT MEDICATIONS:   Home Medications     Reviewed by Liat Blanca (Pharmacy Tech) on 07/28/20 at 1051  Med List Status: Complete   Medication Last Dose Status   Ascorbic Acid (VITAMIN C PO) 7/25/2020 Active   aspirin EC (ECOTRIN) 81 MG Tablet Delayed Response 7/25/2020 Active   Calcium-Magnesium (LENKA/MAG PO) 7/25/2020 Active   Cholecalciferol (VITAMIN D3 PO) 7/25/2020 Active   Lenalidomide (REVLIMID) 5 MG Cap 7/25/2020 Active   multivitamin (THERAGRAN) Tab 7/25/2020 Active   TURMERIC PO 7/25/2020 Active                FAMILY HISTORY:   Family History   Problem Relation Age of Onset   • Cancer Father         SOCIAL HISTORY:   Social History     Tobacco Use   • Smoking status: Never Smoker   • Smokeless tobacco: Never Used   Substance and Sexual Activity   • Alcohol use: Yes   • Drug use: Never   • Sexual activity: Not on file       REVIEW OF SYSTEMS:  "Comprehensive review of systems was negative aside from abdominal pain described in the history and physical    PHYSICAL EXAMINATION:     GENERAL: The patient is in no acute distress.   VITAL SIGNS: /57   Pulse 78   Temp 37.3 °C (99.2 °F) (Temporal)   Resp 16   Ht 1.676 m (5' 6\")   Wt 66.7 kg (147 lb 1.6 oz)   SpO2 92%   HEAD AND NECK: Demonstrates symmetric, reactive pupils. Extraocular muscles   are intact. Nares and oropharynx are clear.   NECK: Supple. No adenopathy.  CHEST:No respiratory distress.    CARDIOVASCULAR: Regular rate. The extremities are well perfused.   ABDOMEN: Thin abdomen.  Minimal scarring.  Tender to palpation in the right upper quadrant..   EXTREMITIES: Examination of the upper and lower extremities demonstrates no cyanosis edema or clubbing.  NEUROLOGIC: Alert & oriented x 3, Normal motor function, Normal sensory function, No focal deficits noted.    LABORATORY VALUES:   Recent Labs     07/28/20  0629   WBC 1.6*   RBC 4.64*   HEMOGLOBIN 15.1   HEMATOCRIT 44.0   MCV 94.8   MCH 32.5   MCHC 34.3   RDW 45.9   PLATELETCT 63*   MPV 9.8     Recent Labs     07/28/20  0629   SODIUM 132*   POTASSIUM 3.9   CHLORIDE 94*   CO2 24   GLUCOSE 115*   BUN 9   CREATININE 0.77   CALCIUM 9.0     Recent Labs     07/28/20  0629   ASTSGOT 64*   ALTSGPT 55*   TBILIRUBIN 1.4   ALKPHOSPHAT 58   GLOBULIN 2.6            IMAGING:   US-RUQ   Final Result      Gallbladder wall thickening can be seen in cholecystitis, hepatitis, cirrhosis and hypoproteinemia. There is trace pericholecystic fluid. No gallstones are identified.      No biliary ductal dilatation is seen.      Hepatic cyst.      Atherosclerotic plaque.      CT-ABDOMEN-PELVIS WITH   Final Result      Gallbladder wall thickening with pericholecystic fluid. There are inflammatory changes in the right upper quadrant. Findings appear compatible with cholecystitis. Further evaluation can be obtained with right upper quadrant ultrasound.      Hypodense " hepatic lesion likely represents a cyst. Tiny hypodense lesion in segment 4 is too small to characterize but may also represent a small cyst.      Moderate amount of colonic stool.      No evidence of acute appendicitis.      Left renal cyst.      Multiple compression fractures in the thoracic and lumbar spine.      Atherosclerotic plaque.      NM-BILIARY (HIDA) SCAN WITH CCK    (Results Pending)       IMPRESSION AND PLAN:     1.  Rule out a calculus cholecystitis.  2.  Multiple myeloma    1.  I have requested a HIDA scan to rule out a calculus cholecystitis.  I believe this is prudent in this scenario in a patient with multiple myeloma and currently immunosuppressed.  Should this be positive we will place him on the schedule for a laparoscopic cholecystectomy tomorrow morning.  Clears okay after HIDA scan.    2.  Should the HIDA scan be positive, the patient will be taken to the operating room for a laparoscopic cholecystectomy.. The surgical conduct was explained. Potential complications including but not limited to infection, bleeding, damage to adjacent structures, anesthetic complications were discussed in detail. Questions were elicited and answered to his satisfaction. He understands the rationale for surgery and elects to proceed.  Operative consent signed.          ___________________________________   Carson Andino M.D.    DD: 7/28/2020 DT: 3:06 PM

## 2020-07-28 NOTE — SENIOR ADMIT NOTE
"[facility] MEDICINE SENIOR ADMIT NOTE:                                                           Chief Complaint: RUQ pain    History of Present Illness:    Mathew Alexis is a 70 y.o. male with past medical history significant for multiple myeloma status post autologous bone marrow transplant on lenalidomide who presents with complaints of RUQ pain who was found to have findings concerning for cholecystitis on ultrasound and abdominal CT scan.      Physical Exam:   Vitals:    07/28/20 1101 07/28/20 1201 07/28/20 1306 07/28/20 1411   BP: 120/57 127/66 118/57    Pulse: 67 76 78    Resp:   16    Temp:   37.8 °C (100.1 °F) 37.3 °C (99.2 °F)   TempSrc:   Temporal Temporal   SpO2: 95% 95% 92%    Weight:   66.7 kg (147 lb 1.6 oz)    Height:   1.676 m (5' 6\")      Body mass index is 23.74 kg/m².  /57   Pulse 78   Temp 37.3 °C (99.2 °F) (Temporal)   Resp 16   Ht 1.676 m (5' 6\")   Wt 66.7 kg (147 lb 1.6 oz)   SpO2 92%   BMI 23.74 kg/m²   O2 therapy: Pulse Oximetry: 92 %, O2 (LPM): 0, O2 Delivery Device: None - Room Air    General: Non-toxic appearing, in NAD  HEENT: normocephalic, atraumatic, no scleral icterus   Cardiovascular: RRR without m/r/g, no lower extremity edema   Lungs: Clear to auscultation bilaterally, no crackles or wheezes  Abdomen: Soft, +RUQ tenderness, negative turcios's  Skin: No rashes or erythema appreciated  Neurologic: AAO x4,     Pertinent Labs:   Leukopenia(though ANC is actually good at 1.14)  Thrombocytopenia  Mild transaminase elevation    Pertinent Imaging:   RUQ ultrasound shows gallbladder wall thickening and pericholecystic fluid     Assessment and Plan:    #Acute cholecystitis, acalculous  -admit to surgery  -Leukopenic but no tachycardia, tachypnea, fever although immunosuppressed. Not hypotensive for now so will hold aggressive fluid boluses but will give as needed for hypotension or tachycardia.  -Aggressive antibiotics given immunosuppressed status; Zosyn  -Blood " cultures  -Maintenance fluids, NPO  -HIDA, if positive likely surgery tomorrow with Dr. Andino. Appreciate surgical consultation  -Transfuse platelets >50k  -SCDs, hold pharmacologic DVT prophylaxis    #Multiple myeloma  -Status post autologous stem cell transplant, on lenalidomide  -Dr. Hernandez on board per surgical recommendations, ok to hold lenalidomide  -Notified outpatient oncologist of admission    For full details see H&P by Dr. Brisa Rachel M.D.

## 2020-07-28 NOTE — NON-PROVIDER
"History & Physical Note    Date of Admission: 7/28/2020  Admission Status: Observation-Outpatient  [facility] Team: [facility] Team  Attending: No att. providers found   Senior Resident: Dr. Rachel  Intern: Dr. Ledezma   Contact Number: [phone]    Chief Complaint: \"RUQ abdominal pain\"     History of Present Illness (HPI): Patient is a 70 y.o. male with PMH of multiple myeloma on 5mg of Revlimid maintenance therapy, HTN, and s/p stem cell transplant 2 years ago present to the ED with 2 days of severe right upper quadrant abdominal pain not complicated by N/V but had subjective fever. Patient's RUQ abdominal pain with first episode being 6 months ago. Patient said it was an isolated event and didn't bother him as much after. Patient has been experiencing constipation side effect from his Revlimid which can bring about abdominal discomfort but normally resolves with Senokot tea. Patient decided to come to the ER this time because the pain was not resolving with BM after aleve and stool softer like it used to. Patient describes it as sharp achy pain located in the RQU not radiating anywhere else. No aggrevating or alleviating factors.     ED course: patient arrived at the ED feeling very ill. Patient was given 1L LR bolus, 4mg/ml morphine for pain, and ceftriaxone 1 g. Patient was put on NPO while labs and imaging were obtained.     Review of Systems:   Review of Systems   Constitutional: Positive for chills and fever. Negative for diaphoresis and weight loss.   HENT: Negative for congestion and sore throat.    Eyes: Negative for blurred vision and double vision.   Respiratory: Negative for cough, shortness of breath and wheezing.    Cardiovascular: Negative for chest pain and palpitations.   Gastrointestinal: Positive for abdominal pain, constipation and nausea. Negative for blood in stool, diarrhea, melena and vomiting.   Genitourinary: Negative for flank pain and hematuria.   Musculoskeletal: Positive for back " pain. Negative for myalgias and neck pain.   Skin: Negative for itching and rash.   Neurological: Negative for tingling, sensory change, focal weakness, weakness and headaches.   Endo/Heme/Allergies: Does not bruise/bleed easily.   Psychiatric/Behavioral: Negative for depression and suicidal ideas. The patient is nervous/anxious.          Past Medical History:   Past Medical History was reviewed with patient.  -HTN, multiple myeloma, bone marrow transplant    Past Surgical History: Past Surgical History was reviewed with patient.  -vasectomy, transplant (2018), chest tube insertion for flail chest.     Medications: Medications have been reviewed with patient.  Prior to Admission Medications   Prescriptions Last Dose Informant Patient Reported? Taking?   Ascorbic Acid (VITAMIN C PO) 7/25/2020 Patient Yes No   Sig: Take 1 Tab by mouth every day.   Calcium-Magnesium (LENKA/MAG PO) 7/25/2020 Patient Yes Yes   Sig: Take 1 Tab by mouth every day.   Cholecalciferol (VITAMIN D3 PO) 7/25/2020 Patient Yes No   Sig: Take 1 Tab by mouth every day.   Lenalidomide (REVLIMID) 5 MG Cap 7/25/2020 at PM Patient Yes No   Sig: Take 5 mg by mouth every day. 21 DAYS ON  7 DAYS OFF   TURMERIC PO 7/25/2020 Patient Yes Yes   Sig: Take 1 Tab by mouth every day.   aspirin EC (ECOTRIN) 81 MG Tablet Delayed Response 7/25/2020 Patient Yes Yes   Sig: Take 81 mg by mouth every day.   multivitamin (THERAGRAN) Tab 7/25/2020 Patient Yes Yes   Sig: Take 1 Tab by mouth every day.      Facility-Administered Medications: None        Allergies: Allergies have been reviewed with patient.  No Known Allergies    Family History:   Father: metastatic kidney cancer  Sister: mitral valve abnormality     Social History:   Tobacco: never   Alcohol: socially (occastionally 1-2 beer)   Recreational drugs (illegal and prescription):  Weed back in college  Employment: did not obtain   Activity Level:  active  Living situation:  Lives with his son in a house in  [place]   Recent travel:  Not recently and no international traveling   Primary Care Provider: not reviewed YVONNE Strauss  Other (stressors, spirituality, exposures):  Outdoor activity   Physical Exam:   Vitals:  Temp:  [36.1 °C (97 °F)] 36.1 °C (97 °F)  Pulse:  [67-90] 76  Resp:  [16-20] 18  BP: (120-141)/(57-81) 127/66  SpO2:  [95 %-99 %] 95 %    Physical Exam  Constitutional:       General: He is not in acute distress.     Appearance: Normal appearance. He is not ill-appearing or toxic-appearing.   HENT:      Head: Normocephalic and atraumatic.      Nose: Nose normal.      Mouth/Throat:      Mouth: Mucous membranes are dry.      Pharynx: Oropharynx is clear. No oropharyngeal exudate or posterior oropharyngeal erythema.   Eyes:      General: No scleral icterus.     Extraocular Movements: Extraocular movements intact.      Conjunctiva/sclera: Conjunctivae normal.      Pupils: Pupils are equal, round, and reactive to light.   Neck:      Musculoskeletal: Normal range of motion. No muscular tenderness.   Cardiovascular:      Rate and Rhythm: Normal rate and regular rhythm.      Pulses: Normal pulses.      Heart sounds: Normal heart sounds. No murmur. No friction rub.   Pulmonary:      Effort: Pulmonary effort is normal.      Breath sounds: Normal breath sounds. No wheezing.   Chest:      Chest wall: No tenderness.   Abdominal:      General: Abdomen is flat. Bowel sounds are decreased. There is no distension.      Palpations: There is no mass.      Tenderness: There is abdominal tenderness in the right upper quadrant. There is no guarding or rebound. Positive signs include Goodman's sign. Negative signs include Rovsing's sign and McBurney's sign.   Musculoskeletal:         General: No swelling or deformity.      Right lower leg: No edema.      Left lower leg: No edema.   Lymphadenopathy:      Cervical: No cervical adenopathy.   Skin:     General: Skin is warm.      Coloration: Skin is not jaundiced.       Findings: No bruising.   Neurological:      General: No focal deficit present.      Mental Status: He is alert and oriented to person, place, and time.      Cranial Nerves: No cranial nerve deficit.      Sensory: No sensory deficit.      Motor: No weakness.      Deep Tendon Reflexes: Reflexes normal.   Psychiatric:         Mood and Affect: Mood normal.         Behavior: Behavior normal.         Labs:   Results for orders placed or performed during the hospital encounter of 07/28/20   CBC WITH DIFFERENTIAL   Result Value Ref Range    WBC 1.6 (LL) 4.8 - 10.8 K/uL    RBC 4.64 (L) 4.70 - 6.10 M/uL    Hemoglobin 15.1 14.0 - 18.0 g/dL    Hematocrit 44.0 42.0 - 52.0 %    MCV 94.8 81.4 - 97.8 fL    MCH 32.5 27.0 - 33.0 pg    MCHC 34.3 33.7 - 35.3 g/dL    RDW 45.9 35.9 - 50.0 fL    Platelet Count 63 (L) 164 - 446 K/uL    MPV 9.8 9.0 - 12.9 fL    Neutrophils-Polys 60.90 44.00 - 72.00 %    Lymphocytes 16.50 (L) 22.00 - 41.00 %    Monocytes 8.70 0.00 - 13.40 %    Eosinophils 3.50 0.00 - 6.90 %    Basophils 0.00 0.00 - 1.80 %    Nucleated RBC 0.00 /100 WBC    Neutrophils (Absolute) 1.14 (L) 1.82 - 7.42 K/uL    Lymphs (Absolute) 0.26 (L) 1.00 - 4.80 K/uL    Monos (Absolute) 0.14 0.00 - 0.85 K/uL    Eos (Absolute) 0.06 0.00 - 0.51 K/uL    Baso (Absolute) 0.00 0.00 - 0.12 K/uL    NRBC (Absolute) 0.00 K/uL   COMP METABOLIC PANEL   Result Value Ref Range    Sodium 132 (L) 135 - 145 mmol/L    Potassium 3.9 3.6 - 5.5 mmol/L    Chloride 94 (L) 96 - 112 mmol/L    Co2 24 20 - 33 mmol/L    Anion Gap 14.0 7.0 - 16.0    Glucose 115 (H) 65 - 99 mg/dL    Bun 9 8 - 22 mg/dL    Creatinine 0.77 0.50 - 1.40 mg/dL    Calcium 9.0 8.5 - 10.5 mg/dL    AST(SGOT) 64 (H) 12 - 45 U/L    ALT(SGPT) 55 (H) 2 - 50 U/L    Alkaline Phosphatase 58 30 - 99 U/L    Total Bilirubin 1.4 0.1 - 1.5 mg/dL    Albumin 4.4 3.2 - 4.9 g/dL    Total Protein 7.0 6.0 - 8.2 g/dL    Globulin 2.6 1.9 - 3.5 g/dL    A-G Ratio 1.7 g/dL   URINALYSIS CULTURE, IF INDICATED    Specimen:  Urine, Clean Catch   Result Value Ref Range    Color Yellow     Character Clear     Specific Gravity 1.018 <1.035    Ph 5.5 5.0 - 8.0    Glucose Negative Negative mg/dL    Ketones 40 (A) Negative mg/dL    Protein 100 (A) Negative mg/dL    Bilirubin Negative Negative    Urobilinogen, Urine 0.2 Negative    Nitrite Negative Negative    Leukocyte Esterase Negative Negative    Occult Blood Small (A) Negative    Micro Urine Req Microscopic    ESTIMATED GFR   Result Value Ref Range    GFR If African American >60 >60 mL/min/1.73 m 2    GFR If Non African American >60 >60 mL/min/1.73 m 2   PERIPHERAL SMEAR REVIEW   Result Value Ref Range    Peripheral Smear Review see below    PLATELET ESTIMATE   Result Value Ref Range    Plt Estimation Decreased    MORPHOLOGY   Result Value Ref Range    RBC Morphology Normal    DIFFERENTIAL MANUAL   Result Value Ref Range    Bands-Stabs 10.40 (H) 0.00 - 10.00 %    Manual Diff Status PERFORMED    URINE MICROSCOPIC (W/UA)   Result Value Ref Range    WBC 2-5 (A) /hpf    RBC 0-2 (A) /hpf    Bacteria Few (A) None /hpf    Epithelial Cells Few /hpf    Mucous Threads Few /hpf    Hyaline Cast 0-2 /lpf            Imaging:   CT-Abdomen-Pelvis with     IMPRESSION:     Gallbladder wall thickening with pericholecystic fluid. There are inflammatory changes in the right upper quadrant. Findings appear compatible with cholecystitis. Further evaluation can be obtained with right upper quadrant ultrasound.     Hypodense hepatic lesion likely represents a cyst. Tiny hypodense lesion in segment 4 is too small to characterize but may also represent a small cyst.     Moderate amount of colonic stool.     No evidence of acute appendicitis.     Left renal cyst.     Multiple compression fractures in the thoracic and lumbar spine.     Atherosclerotic plaque.      US-RUQ    IMPRESSION:     Gallbladder wall thickening can be seen in cholecystitis, hepatitis, cirrhosis and hypoproteinemia. There is trace pericholecystic  fluid. No gallstones are identified.     No biliary ductal dilatation is seen.     Hepatic cyst.     Atherosclerotic plaque.      Problem Representation: Patient is an 70 y.o. male with PMH of MM currently on Revlimid, medication induced constipation, HTN not on medication, and s/p stem cell transplant present with 2 days of acute severe and sharp RUQ abdominal pain with subjective fever and chills not complicated by N/V/D. Patient has no significant modifiable risk factors other a history of RUQ pain that started 6 month ago that normally resolves with BM.     RUQ abdominal pain:  Assessment and Plan:     -Patient came in with 2 days of severe RUQ abdominal pain with subjective fever but no radiation leads to differential of anything that can be damaged around the T4 region  -Differential dx: cholecystitis (acalculus vs. Calculus (cholesterol vs. Pigmented)), hepatitis (any etiology), acute pancreatitis, lung etiology, subphrenic mass or abscess, biliary pathology.   -patient is very active outdoor but no trauma tot he area, patient is a social drinker and not a smoker. Patient has no bleeding issues or on blood thinner. Patient is not jaundiced on PE  -Patient had similar but less severe symptoms that started 6 months ago normally resolved with senokot T which decompress the constipation induced by Revlimid  -patient's WBC is 1.6 with baseline at 2 due to immunosuppressant  -Patient Hgb is 15.1 so not anemic or bleeding  -patient is also thrombocytopenic due to immunosuppression.   -CT showed gallbladder wall thickening with pericholecystic fluid. There are inflammatory changes in the right upper quadrant consistent with cholecystitis   -no stones were found so HIDA scan was ordered   -patient was Goodman positive  -patient currently no in significant pain after morphine was given   -bone marrow transplantation is a risk factor for acalculous cholecystitis  -patient is not uroseptic  -radiologic features for  acalculous cholecystitis for more definitive dx include gallbladder wall thickening, sonographic Goodman's sign, and pericholecystic fluid).   -patient is currently hemodynamically stable    Plan:   -consulted with surgery for possible cholecystectomy (if emphysematous cholecystitis, perforation or necrosis findings)  or drainage  -IV zosyn 4.5 g IV q6 hours given for high risk community acquired intraabdominal infections  -IV morphine PRN for pain  -Patient on NPO and will be given 1L bolus NS for hypovolemic hyponatremia and 80ml/hr maintenance fluid after.  -order blood cx to narrow empiric antibiotics     Multiple myeloma/stem cell transplant  Assessment and Plan:     -Patient had stem cell transplant 2 years ago  -cu rrently with oncologist Dorcas Covarrubias MD   -patient currently on Revlimid  -patient only experiences constipation from the medication and no other adverse effects   -RUQ seems to be unrelated to his meds or constipation       Plan:   -will call oncologist   -Continue with current chemo regimen   -request patient record from his doctors   -f/u with Dr. Covarrubias and PCP       DVT prophylaxis with SCD

## 2020-07-28 NOTE — ED TRIAGE NOTES
Mathew Alexis  70 y.o. male  Chief Complaint   Patient presents with   • RLQ Pain     x 3 days, +rebound tenderness   /71   Pulse 78   Temp 36.1 °C (97 °F) (Temporal)   Resp 16   Wt 67 kg (147 lb 11.3 oz)   SpO2 99%   BMI 23.84 kg/m²

## 2020-07-29 ENCOUNTER — APPOINTMENT (OUTPATIENT)
Dept: RADIOLOGY | Facility: MEDICAL CENTER | Age: 70
End: 2020-07-29
Attending: STUDENT IN AN ORGANIZED HEALTH CARE EDUCATION/TRAINING PROGRAM
Payer: MEDICARE

## 2020-07-29 VITALS
SYSTOLIC BLOOD PRESSURE: 111 MMHG | WEIGHT: 147.1 LBS | HEIGHT: 66 IN | DIASTOLIC BLOOD PRESSURE: 55 MMHG | TEMPERATURE: 97.6 F | HEART RATE: 52 BPM | BODY MASS INDEX: 23.64 KG/M2 | RESPIRATION RATE: 17 BRPM | OXYGEN SATURATION: 95 %

## 2020-07-29 LAB
ALBUMIN SERPL BCP-MCNC: 3.1 G/DL (ref 3.2–4.9)
ALP SERPL-CCNC: 44 U/L (ref 30–99)
ALT SERPL-CCNC: 53 U/L (ref 2–50)
ANION GAP SERPL CALC-SCNC: 10 MMOL/L (ref 7–16)
AST SERPL-CCNC: 52 U/L (ref 12–45)
BILIRUB CONJ SERPL-MCNC: <0.2 MG/DL (ref 0.1–0.5)
BILIRUB INDIRECT SERPL-MCNC: ABNORMAL MG/DL (ref 0–1)
BILIRUB SERPL-MCNC: 0.7 MG/DL (ref 0.1–1.5)
BUN SERPL-MCNC: 9 MG/DL (ref 8–22)
CALCIUM SERPL-MCNC: 7.4 MG/DL (ref 8.5–10.5)
CHLORIDE SERPL-SCNC: 99 MMOL/L (ref 96–112)
CO2 SERPL-SCNC: 22 MMOL/L (ref 20–33)
CREAT SERPL-MCNC: 0.7 MG/DL (ref 0.5–1.4)
ERYTHROCYTE [DISTWIDTH] IN BLOOD BY AUTOMATED COUNT: 45 FL (ref 35.9–50)
GLUCOSE SERPL-MCNC: 109 MG/DL (ref 65–99)
HCT VFR BLD AUTO: 32.5 % (ref 42–52)
HCT VFR BLD AUTO: 33.1 % (ref 42–52)
HGB BLD-MCNC: 10.8 G/DL (ref 14–18)
HGB BLD-MCNC: 11.2 G/DL (ref 14–18)
MCH RBC QN AUTO: 31.8 PG (ref 27–33)
MCHC RBC AUTO-ENTMCNC: 33.5 G/DL (ref 33.7–35.3)
MCV RBC AUTO: 94.9 FL (ref 81.4–97.8)
PLATELET # BLD AUTO: 52 K/UL (ref 164–446)
PMV BLD AUTO: 9.5 FL (ref 9–12.9)
POTASSIUM SERPL-SCNC: 3.5 MMOL/L (ref 3.6–5.5)
PROT SERPL-MCNC: 5.4 G/DL (ref 6–8.2)
RBC # BLD AUTO: 3.52 M/UL (ref 4.7–6.1)
SODIUM SERPL-SCNC: 131 MMOL/L (ref 135–145)
WBC # BLD AUTO: 1.3 K/UL (ref 4.8–10.8)

## 2020-07-29 PROCEDURE — 96366 THER/PROPH/DIAG IV INF ADDON: CPT

## 2020-07-29 PROCEDURE — 80048 BASIC METABOLIC PNL TOTAL CA: CPT

## 2020-07-29 PROCEDURE — 74018 RADEX ABDOMEN 1 VIEW: CPT

## 2020-07-29 PROCEDURE — 700111 HCHG RX REV CODE 636 W/ 250 OVERRIDE (IP): Performed by: STUDENT IN AN ORGANIZED HEALTH CARE EDUCATION/TRAINING PROGRAM

## 2020-07-29 PROCEDURE — 700105 HCHG RX REV CODE 258: Performed by: STUDENT IN AN ORGANIZED HEALTH CARE EDUCATION/TRAINING PROGRAM

## 2020-07-29 PROCEDURE — A9270 NON-COVERED ITEM OR SERVICE: HCPCS | Performed by: STUDENT IN AN ORGANIZED HEALTH CARE EDUCATION/TRAINING PROGRAM

## 2020-07-29 PROCEDURE — G0378 HOSPITAL OBSERVATION PER HR: HCPCS

## 2020-07-29 PROCEDURE — 85014 HEMATOCRIT: CPT

## 2020-07-29 PROCEDURE — 700102 HCHG RX REV CODE 250 W/ 637 OVERRIDE(OP): Performed by: STUDENT IN AN ORGANIZED HEALTH CARE EDUCATION/TRAINING PROGRAM

## 2020-07-29 PROCEDURE — 700101 HCHG RX REV CODE 250: Performed by: STUDENT IN AN ORGANIZED HEALTH CARE EDUCATION/TRAINING PROGRAM

## 2020-07-29 PROCEDURE — 85027 COMPLETE CBC AUTOMATED: CPT

## 2020-07-29 PROCEDURE — 80076 HEPATIC FUNCTION PANEL: CPT

## 2020-07-29 PROCEDURE — 83010 ASSAY OF HAPTOGLOBIN QUANT: CPT

## 2020-07-29 PROCEDURE — 85018 HEMOGLOBIN: CPT

## 2020-07-29 PROCEDURE — 99217 PR OBSERVATION CARE DISCHARGE: CPT | Mod: GC | Performed by: HOSPITALIST

## 2020-07-29 RX ORDER — POLYETHYLENE GLYCOL 3350 17 G/17G
1 POWDER, FOR SOLUTION ORAL DAILY
Status: DISCONTINUED | OUTPATIENT
Start: 2020-07-29 | End: 2020-07-29 | Stop reason: HOSPADM

## 2020-07-29 RX ORDER — AMOXICILLIN 250 MG
2 CAPSULE ORAL 2 TIMES DAILY
Status: DISCONTINUED | OUTPATIENT
Start: 2020-07-29 | End: 2020-07-29 | Stop reason: HOSPADM

## 2020-07-29 RX ORDER — BISACODYL 10 MG
10 SUPPOSITORY, RECTAL RECTAL
Status: DISCONTINUED | OUTPATIENT
Start: 2020-07-29 | End: 2020-07-29 | Stop reason: HOSPADM

## 2020-07-29 RX ORDER — ACETAMINOPHEN 500 MG
1000 TABLET ORAL 3 TIMES DAILY
Status: DISCONTINUED | OUTPATIENT
Start: 2020-07-29 | End: 2020-07-29 | Stop reason: HOSPADM

## 2020-07-29 RX ORDER — NAPROXEN 500 MG/1
500 TABLET ORAL 2 TIMES DAILY
Qty: 20 TAB | Refills: 0 | Status: SHIPPED
Start: 2020-07-29 | End: 2020-12-31

## 2020-07-29 RX ORDER — NAPROXEN 500 MG/1
500 TABLET ORAL 2 TIMES DAILY
Status: DISCONTINUED | OUTPATIENT
Start: 2020-07-29 | End: 2020-07-29 | Stop reason: HOSPADM

## 2020-07-29 RX ORDER — POTASSIUM CHLORIDE 20 MEQ/1
40 TABLET, EXTENDED RELEASE ORAL ONCE
Status: COMPLETED | OUTPATIENT
Start: 2020-07-29 | End: 2020-07-29

## 2020-07-29 RX ADMIN — POLYETHYLENE GLYCOL 3350 1 PACKET: 17 POWDER, FOR SOLUTION ORAL at 09:43

## 2020-07-29 RX ADMIN — ACETAMINOPHEN 1000 MG: 500 TABLET ORAL at 13:19

## 2020-07-29 RX ADMIN — PIPERACILLIN AND TAZOBACTAM 4.5 G: 4; .5 INJECTION, POWDER, LYOPHILIZED, FOR SOLUTION INTRAVENOUS; PARENTERAL at 05:02

## 2020-07-29 RX ADMIN — OXYCODONE 5 MG: 5 TABLET ORAL at 02:32

## 2020-07-29 RX ADMIN — MAGNESIUM CITRATE 296 ML: 1.75 LIQUID ORAL at 15:57

## 2020-07-29 RX ADMIN — DOCUSATE SODIUM 50 MG AND SENNOSIDES 8.6 MG 2 TABLET: 8.6; 5 TABLET, FILM COATED ORAL at 05:07

## 2020-07-29 RX ADMIN — POTASSIUM CHLORIDE 40 MEQ: 1500 TABLET, EXTENDED RELEASE ORAL at 08:26

## 2020-07-29 RX ADMIN — NAPROXEN 500 MG: 500 TABLET ORAL at 09:44

## 2020-07-29 ASSESSMENT — ENCOUNTER SYMPTOMS
ORTHOPNEA: 0
NERVOUS/ANXIOUS: 0
BLURRED VISION: 0
BRUISES/BLEEDS EASILY: 1
DEPRESSION: 0
TINGLING: 0
HEADACHES: 0
DIZZINESS: 0
DIARRHEA: 0
NAUSEA: 0
SHORTNESS OF BREATH: 0
MYALGIAS: 0
WEAKNESS: 0
FEVER: 0
DOUBLE VISION: 0
SORE THROAT: 0
VOMITING: 0
COUGH: 0
PALPITATIONS: 0
CHILLS: 0
CONSTIPATION: 0
ABDOMINAL PAIN: 1

## 2020-07-29 NOTE — DISCHARGE SUMMARY
Discharge Summary    CHIEF COMPLAINT ON ADMISSION  Chief Complaint   Patient presents with   • RLQ Pain     x 3 days, +rebound tenderness       Reason for Admission  Abdominal pain     Admission Date  7/28/2020    CODE STATUS  Full Code    HPI & HOSPITAL COURSE  This is a 70 y.o. male here with Acute abdominal pain of the RUQ for 3 days, intense, sharp, non-radiating, 7-8/10. Originally thought to be related to chronic constipation, but after taking senna and having bowel movement, did not feel any relief; attempted to use Aleve also with no relief. Worsened with inspirations. Yesterday, began to feel worse, with new onset subjective fever and chills. This was new pain from chronic 6m abdominal pain related to constipation. Denies nausea/vomiting, hematochezia/hematuria, flank pain, itching sensation/skin changes.Acute abdominal pain of the RUQ for 3 days, intense, sharp, non-radiating, 7-8/10. Originally thought to be related to chronic constipation, but after taking senna and having bowel movement, did not feel any relief; attempted to use Aleve also with no relief. Worsened with inspirations. Yesterday, began to feel worse, with new onset subjective fever and chills. This was new pain from chronic abdominal pain related to constipation. Denies nausea/vomiting, hematochezia/hematuria, flank pain, itching sensation/skin changes.    During hospitalization, underwent workup for acalculus cholecystitis, with consultation to surgery for possible cholecystectomy. Confirmatory HIDA scan was performed and revealed slow drainage of the gallbladder, but no paralysis or obstruction. Given his immunocompromised status, zosyn was started for infectious control. Surgery team determined that he was not an urgent surgical candidate, and instructed team to re contact if change in symptoms.    Was given naproxen for pain control, with improvement in pain. Additionally, passed flatus which incurred increased pain relief. At  discharge, pain was within acceptable limits, and was desiring to do bowel regimen at home.       Therefore, he is discharged in fair and stable condition to home with close outpatient follow-up.    The patient recovered much more quickly than anticipated on admission.    Discharge Date  7/29    FOLLOW UP ITEMS POST DISCHARGE  Follow up primary care for abdominal pain/constipation  Follow up with [facility] for Lenalidomide restart/dosing    DISCHARGE DIAGNOSES  Active Problems:    Cholecystitis POA: Unknown    Hypertension POA: Unknown    Multiple myeloma in remission (HCC) POA: Yes  Resolved Problems:    * No resolved hospital problems. *      FOLLOW UP  No future appointments.  YVONNE Strauss  [address]  [facility] [address]  [facility] [id number] [phone]          [facility]    Schedule an appointment as soon as possible for a visit in 1 week  Follow up with Dr. Covarrubias regarding lenalidomide      MEDICATIONS ON DISCHARGE     Medication List      START taking these medications      Instructions   naproxen 500 MG Tabs  Commonly known as:  NAPROSYN   Take 1 Tab by mouth 2 Times a Day.  Dose:  500 mg        CONTINUE taking these medications      Instructions   aspirin EC 81 MG Tbec  Commonly known as:  ECOTRIN   Take 81 mg by mouth every day.  Dose:  81 mg     LENKA/MAG PO   Take 1 Tab by mouth every day.  Dose:  1 Tab     multivitamin Tabs   Take 1 Tab by mouth every day.  Dose:  1 Tab     TURMERIC PO   Take 1 Tab by mouth every day.  Dose:  1 Tab     VITAMIN C PO   Take 1 Tab by mouth every day.  Dose:  1 Tab     VITAMIN D3 PO   Take 1 Tab by mouth every day.  Dose:  1 Tab        STOP taking these medications    Revlimid 5 MG Caps  Generic drug:  Lenalidomide            Allergies  No Known Allergies    DIET  Orders Placed This Encounter   Procedures   • Diet Order Regular     Standing Status:   Standing     Number of Occurrences:   1     Order Specific Question:   Diet:      Answer:   Regular [1]       ACTIVITY  As tolerated.  Weight bearing as tolerated    CONSULTATIONS  General surgery      PROCEDURES  N/A    LABORATORY  Lab Results   Component Value Date    SODIUM 131 (L) 07/29/2020    POTASSIUM 3.5 (L) 07/29/2020    CHLORIDE 99 07/29/2020    CO2 22 07/29/2020    GLUCOSE 109 (H) 07/29/2020    BUN 9 07/29/2020    CREATININE 0.70 07/29/2020        Lab Results   Component Value Date    WBC 1.3 (LL) 07/29/2020    HEMOGLOBIN 10.8 (L) 07/29/2020    HEMATOCRIT 32.5 (L) 07/29/2020    PLATELETCT 52 (L) 07/29/2020        Total time of the discharge process exceeds 30 minutes.

## 2020-07-29 NOTE — CARE PLAN
Problem: Safety  Goal: Will remain free from falls  Outcome: PROGRESSING AS EXPECTED     Problem: Venous Thromboembolism (VTW)/Deep Vein Thrombosis (DVT) Prevention:  Goal: Patient will participate in Venous Thrombosis (VTE)/Deep Vein Thrombosis (DVT)Prevention Measures  Outcome: PROGRESSING AS EXPECTED     Problem: Psychosocial Needs:  Goal: Level of anxiety will decrease  Outcome: PROGRESSING AS EXPECTED     Problem: Fluid Volume:  Goal: Will maintain balanced intake and output  Outcome: PROGRESSING AS EXPECTED     Problem: Respiratory:  Goal: Respiratory status will improve  Outcome: PROGRESSING AS EXPECTED     Problem: Bowel/Gastric:  Goal: Normal bowel function is maintained or improved  Outcome: PROGRESSING AS EXPECTED  Goal: Will not experience complications related to bowel motility  Outcome: PROGRESSING AS EXPECTED     Problem: Urinary Elimination:  Goal: Ability to reestablish a normal urinary elimination pattern will improve  Outcome: PROGRESSING AS EXPECTED     Problem: Skin Integrity  Goal: Risk for impaired skin integrity will decrease  Outcome: PROGRESSING AS EXPECTED     Problem: Mobility  Goal: Risk for activity intolerance will decrease  Outcome: PROGRESSING AS EXPECTED     Problem: Medication  Goal: Compliance with prescribed medication will improve  Outcome: PROGRESSING AS EXPECTED     Problem: Knowledge Deficit  Goal: Knowledge of disease process/condition, treatment plan, diagnostic tests, and medications will improve  Outcome: PROGRESSING AS EXPECTED  Goal: Knowledge of the prescribed therapeutic regimen will improve  Outcome: PROGRESSING AS EXPECTED     Problem: Discharge Barriers/Planning  Goal: Patient's continuum of care needs will be met  Outcome: PROGRESSING AS EXPECTED

## 2020-07-29 NOTE — PROGRESS NOTES
"Daily Progress Note:     Date of Service: 7/29/2020  Primary Team: [facility] Purple Team   Attending: Abhi Rousseau M.D.   Senior Resident: Dr. Rachel  Intern: Dr. Ledezma  Contact:  [phone]    Chief Complaint:   Abdominal pain    Subjective:  NAOE. Feeling better. Endorses crampy RUQ pain. No BM today. Denies CP, SOB.  Interval update: Feeling ready for home. \"Im doing a lot better, I think I can do my bowel regimen at home.\"    Consultants/Specialty:  GI  Gen Surgery    Review of Systems:    Review of Systems   Constitutional: Negative for chills, fever and malaise/fatigue.   HENT: Negative for congestion, hearing loss, nosebleeds, sore throat and tinnitus.    Eyes: Negative for blurred vision and double vision.   Respiratory: Negative for cough and shortness of breath.    Cardiovascular: Negative for chest pain, palpitations, orthopnea and leg swelling.   Gastrointestinal: Positive for abdominal pain. Negative for constipation, diarrhea, nausea and vomiting.   Genitourinary: Negative for dysuria, frequency and urgency.   Musculoskeletal: Negative for joint pain and myalgias.   Neurological: Negative for dizziness, tingling, weakness and headaches.   Endo/Heme/Allergies: Bruises/bleeds easily.   Psychiatric/Behavioral: Negative for depression. The patient is not nervous/anxious.        Objective Data:   Physical Exam:   Vitals:   Temp:  [36.1 °C (97 °F)-37.1 °C (98.7 °F)] 36.4 °C (97.6 °F)  Pulse:  [52-63] 52  Resp:  [16-17] 17  BP: ()/(53-59) 111/55  SpO2:  [94 %-96 %] 95 %     Physical Exam  Constitutional:       General: He is not in acute distress.  HENT:      Head: Normocephalic and atraumatic.      Mouth/Throat:      Mouth: Mucous membranes are moist.      Pharynx: Oropharynx is clear.   Eyes:      Extraocular Movements: Extraocular movements intact.      Pupils: Pupils are equal, round, and reactive to light.   Neck:      Musculoskeletal: Normal range of motion.   Cardiovascular:      Rate " and Rhythm: Normal rate and regular rhythm.      Heart sounds: No murmur. No friction rub. No gallop.    Pulmonary:      Breath sounds: Normal breath sounds.   Abdominal:      General: There is no distension.      Tenderness: There is no abdominal tenderness. There is no rebound.   Musculoskeletal: Normal range of motion.   Skin:     General: Skin is warm and dry.   Neurological:      Mental Status: He is alert.   Psychiatric:         Mood and Affect: Mood normal.         Thought Content: Thought content normal.         Judgment: Judgment normal.           Labs:   Lab Results   Component Value Date/Time    SODIUM 131 (L) 07/29/2020 05:05 AM    POTASSIUM 3.5 (L) 07/29/2020 05:05 AM    CHLORIDE 99 07/29/2020 05:05 AM    CO2 22 07/29/2020 05:05 AM    GLUCOSE 109 (H) 07/29/2020 05:05 AM    BUN 9 07/29/2020 05:05 AM    CREATININE 0.70 07/29/2020 05:05 AM      Recent Labs     07/28/20  0629 07/29/20  0505 07/29/20  1236   WBC 1.6* 1.3*  --    RBC 4.64* 3.52*  --    HEMOGLOBIN 15.1 11.2* 10.8*   HEMATOCRIT 44.0 33.1* 32.5*   MCV 94.8 94.9  --    MCH 32.5 31.8  --    RDW 45.9 45.0  --    PLATELETCT 63* 52*  --    MPV 9.8 9.5  --    NEUTSPOLYS 60.90  --   --    LYMPHOCYTES 16.50*  --   --    MONOCYTES 8.70  --   --    EOSINOPHILS 3.50  --   --    BASOPHILS 0.00  --   --    RBCMORPHOLO Normal  --   --        Imaging:   KUB 7/29: moderate stool in ascending colon, nonspecific bowel gas.    Problem Representation:    Mathew Alexis is a 71yo M with hx of MM, HTN, immunosupression on lenalidomide, presenting for RUQ pain, undergoing investigation for acalculus cholecystitis.    Cholecystitis  Assessment & Plan  Acute cholecystitis without calculus, 3 day history of increased abd pain, nausea. Presented to ED for abd pain and was found to have pericholecystic fluid, concerning for cholecystitis. Surgery consulted for cholecystectomy. HIDA scan showed slow drainage, but without blockage or paralysis. Not a surgical candidate at this  time.  -surgery signed off  -isopropyl pads for nausea  -Naproxen 500mg BID    Hypertension  Assessment & Plan  History of hypertension. Normotensive without medications. Normotensive at admission.  -VS q8h    Multiple myeloma in remission (HCC)- (present on admission)  Assessment & Plan  History of MM, post stem cell transplant. Has been on lenalidomide. Call placed to home oncology office--Elver CARDENAS, discussed potential secondary neoplastic processes due to lenalidomide, unlikely to be cause of current cholecysitis.  -Hold lenalidomide, may restart at discharge  -Follow up with Elver CARDENAS, for lenalidomide restart

## 2020-07-29 NOTE — PROGRESS NOTES
12 hr chart check.     Pt vss overnight. Pain managed w/ 5 oxy and comfortable. Needs addressed and questions answered. No complaints overnight.

## 2020-07-29 NOTE — PROGRESS NOTES
Surgery    No evidence of cholecystitis on HIDA scan.  No symptoms with CCK  GB EF slightly above bottom of normal.  Surgery not emergently indicated.  Please re consult surgery as necessary.

## 2020-07-29 NOTE — CARE PLAN
Problem: Safety  Goal: Will remain free from falls  Outcome: PROGRESSING AS EXPECTED     Problem: Respiratory:  Goal: Respiratory status will improve  Outcome: PROGRESSING AS EXPECTED     Problem: Skin Integrity  Goal: Risk for impaired skin integrity will decrease  Outcome: PROGRESSING AS EXPECTED

## 2020-07-29 NOTE — PROGRESS NOTES
Pt DC'd. IV removed, discharge instructions provided to patient, pt verbalizes understanding. Pt states all questions have been answered. Copy of discharge paperwork provided to pt, signed copy in chart. Pt states all belongings in possession. Pt escorted off unit by  without incident.

## 2020-07-29 NOTE — NON-PROVIDER
"Daily Progress Note:     Date of Service: 7/29/2020  Primary Team: [facility] Purple Team   Attending: Abhi Rousseau M.D.   Senior Resident: Dr. Rachel   Intern: Dr. Ledezma  Contact:  [phone]    Chief Complaint:   \"RUQ abdominal pain\"     Subjective: Patient is a 70 y.o. male with PMH of multiple myeloma on 5mg of Revlimid maintenance therapy, HTN, and s/p stem cell transplant 2 years ago present to the ED with 2 days of severe right upper quadrant abdominal pain not complicated by N/V but had subjective fever. Patient still experiencing pain in the same area (epigastric/RUQ radiating to the lower back).  Patient rated his pain as 6/10. Patient says the oxycodone and tylenol are not resolving his pain. He also has not had a BM for 4 days. Otherwise no N/V/D, SOB, CP, fever, or chills. Patient is aware of what the surgeon told him and agrees with the current plan but would just like to have something else to control his pain because he barely got any sleep last night.       Objective Data:   Physical Exam:   Vitals:   Temp:  [36.1 °C (97 °F)-37.8 °C (100.1 °F)] 36.4 °C (97.6 °F)  Pulse:  [52-78] 52  Resp:  [16-17] 17  BP: ()/(53-59) 111/55  SpO2:  [92 %-96 %] 95 %      Physical Exam  Constitutional:       General: He is not in acute distress.     Appearance: Normal appearance. He is ill-appearing. He is not toxic-appearing.   HENT:      Head: Normocephalic and atraumatic.      Mouth/Throat:      Mouth: Mucous membranes are dry.   Eyes:      Pupils: Pupils are equal, round, and reactive to light.   Cardiovascular:      Rate and Rhythm: Normal rate and regular rhythm.   Pulmonary:      Effort: Pulmonary effort is normal.      Breath sounds: Normal breath sounds. No wheezing.   Abdominal:      General: Abdomen is flat.      Palpations: There is no mass.      Tenderness: There is abdominal tenderness in the right upper quadrant. There is no guarding.   Musculoskeletal: Normal range of motion.         " General: No swelling.   Skin:     General: Skin is warm.   Neurological:      General: No focal deficit present.      Mental Status: He is alert and oriented to person, place, and time.           Labs:   Results for orders placed or performed during the hospital encounter of 07/28/20   CBC WITH DIFFERENTIAL   Result Value Ref Range    WBC 1.6 (LL) 4.8 - 10.8 K/uL    RBC 4.64 (L) 4.70 - 6.10 M/uL    Hemoglobin 15.1 14.0 - 18.0 g/dL    Hematocrit 44.0 42.0 - 52.0 %    MCV 94.8 81.4 - 97.8 fL    MCH 32.5 27.0 - 33.0 pg    MCHC 34.3 33.7 - 35.3 g/dL    RDW 45.9 35.9 - 50.0 fL    Platelet Count 63 (L) 164 - 446 K/uL    MPV 9.8 9.0 - 12.9 fL    Neutrophils-Polys 60.90 44.00 - 72.00 %    Lymphocytes 16.50 (L) 22.00 - 41.00 %    Monocytes 8.70 0.00 - 13.40 %    Eosinophils 3.50 0.00 - 6.90 %    Basophils 0.00 0.00 - 1.80 %    Nucleated RBC 0.00 /100 WBC    Neutrophils (Absolute) 1.14 (L) 1.82 - 7.42 K/uL    Lymphs (Absolute) 0.26 (L) 1.00 - 4.80 K/uL    Monos (Absolute) 0.14 0.00 - 0.85 K/uL    Eos (Absolute) 0.06 0.00 - 0.51 K/uL    Baso (Absolute) 0.00 0.00 - 0.12 K/uL    NRBC (Absolute) 0.00 K/uL   COMP METABOLIC PANEL   Result Value Ref Range    Sodium 132 (L) 135 - 145 mmol/L    Potassium 3.9 3.6 - 5.5 mmol/L    Chloride 94 (L) 96 - 112 mmol/L    Co2 24 20 - 33 mmol/L    Anion Gap 14.0 7.0 - 16.0    Glucose 115 (H) 65 - 99 mg/dL    Bun 9 8 - 22 mg/dL    Creatinine 0.77 0.50 - 1.40 mg/dL    Calcium 9.0 8.5 - 10.5 mg/dL    AST(SGOT) 64 (H) 12 - 45 U/L    ALT(SGPT) 55 (H) 2 - 50 U/L    Alkaline Phosphatase 58 30 - 99 U/L    Total Bilirubin 1.4 0.1 - 1.5 mg/dL    Albumin 4.4 3.2 - 4.9 g/dL    Total Protein 7.0 6.0 - 8.2 g/dL    Globulin 2.6 1.9 - 3.5 g/dL    A-G Ratio 1.7 g/dL   URINALYSIS CULTURE, IF INDICATED    Specimen: Urine, Clean Catch   Result Value Ref Range    Color Yellow     Character Clear     Specific Gravity 1.018 <1.035    Ph 5.5 5.0 - 8.0    Glucose Negative Negative mg/dL    Ketones 40 (A) Negative mg/dL     Protein 100 (A) Negative mg/dL    Bilirubin Negative Negative    Urobilinogen, Urine 0.2 Negative    Nitrite Negative Negative    Leukocyte Esterase Negative Negative    Occult Blood Small (A) Negative    Micro Urine Req Microscopic    ESTIMATED GFR   Result Value Ref Range    GFR If African American >60 >60 mL/min/1.73 m 2    GFR If Non African American >60 >60 mL/min/1.73 m 2   PERIPHERAL SMEAR REVIEW   Result Value Ref Range    Peripheral Smear Review see below    PLATELET ESTIMATE   Result Value Ref Range    Plt Estimation Decreased    MORPHOLOGY   Result Value Ref Range    RBC Morphology Normal    DIFFERENTIAL MANUAL   Result Value Ref Range    Bands-Stabs 10.40 (H) 0.00 - 10.00 %    Manual Diff Status PERFORMED    URINE MICROSCOPIC (W/UA)   Result Value Ref Range    WBC 2-5 (A) /hpf    RBC 0-2 (A) /hpf    Bacteria Few (A) None /hpf    Epithelial Cells Few /hpf    Mucous Threads Few /hpf    Hyaline Cast 0-2 /lpf   COVID/SARS CoV-2 PCR    Specimen: Nasopharyngeal; Respirate   Result Value Ref Range    COVID Order Status Received    SARS-CoV-2, PCR (In-House)   Result Value Ref Range    SARS-CoV-2 Source NP Swab     SARS-CoV-2 (RdRp gene) NotDetected    CBC WITHOUT DIFFERENTIAL   Result Value Ref Range    WBC 1.3 (LL) 4.8 - 10.8 K/uL    RBC 3.52 (L) 4.70 - 6.10 M/uL    Hemoglobin 11.2 (L) 14.0 - 18.0 g/dL    Hematocrit 33.1 (L) 42.0 - 52.0 %    MCV 94.9 81.4 - 97.8 fL    MCH 31.8 27.0 - 33.0 pg    MCHC 33.5 (L) 33.7 - 35.3 g/dL    RDW 45.0 35.9 - 50.0 fL    Platelet Count 52 (L) 164 - 446 K/uL    MPV 9.5 9.0 - 12.9 fL   Basic Metabolic Panel   Result Value Ref Range    Sodium 131 (L) 135 - 145 mmol/L    Potassium 3.5 (L) 3.6 - 5.5 mmol/L    Chloride 99 96 - 112 mmol/L    Co2 22 20 - 33 mmol/L    Glucose 109 (H) 65 - 99 mg/dL    Bun 9 8 - 22 mg/dL    Creatinine 0.70 0.50 - 1.40 mg/dL    Calcium 7.4 (L) 8.5 - 10.5 mg/dL    Anion Gap 10.0 7.0 - 16.0   ESTIMATED GFR   Result Value Ref Range    GFR If   American >60 >60 mL/min/1.73 m 2    GFR If Non African American >60 >60 mL/min/1.73 m 2   HEPATIC FUNCTION PANEL   Result Value Ref Range    Alkaline Phosphatase 44 30 - 99 U/L    AST(SGOT) 52 (H) 12 - 45 U/L    ALT(SGPT) 53 (H) 2 - 50 U/L    Total Bilirubin 0.7 0.1 - 1.5 mg/dL    Direct Bilirubin <0.2 0.1 - 0.5 mg/dL    Indirect Bilirubin see below 0.0 - 1.0 mg/dL    Albumin 3.1 (L) 3.2 - 4.9 g/dL    Total Protein 5.4 (L) 6.0 - 8.2 g/dL     Imaging:     HIDA:   IMPRESSION:     1.  Slightly delayed gallbladder filling. There is gallbladder filling at 60 minutes.     2.  No evidence of acute cholecystitis.     3.  Normal gallbladder ejection fraction = 36 %. No clinical symptoms noted during CCK administration.      Problem Representation: Problem Representation: Patient is an 70 y.o. male with PMH of MM currently on Revlimid, medication induced constipation, HTN not on medication, and s/p stem cell transplant present with 2 days of acute severe and sharp RUQ abdominal pain with subjective fever and chills not complicated by N/V/D. Patient has no significant modifiable risk factors other a history of RUQ pain that started 6 month ago that normally resolves with BM. Patient's HIDA did not show cholecystitis and negative on CCK but abdominal pain has not improved on currently pain regimen.      RUQ abdominal pain:  Assessment and Plan:      -Patient came in with 2 days of severe RUQ abdominal pain with subjective fever but no radiation leads to differential of anything that can be damaged around the T4 region  -Patient had similar but less severe symptoms that started 6 months ago normally resolved with senokot T which decompress the constipation induced by Revlimid  -patient's WBC is 1.6 with baseline at 2 due to immunosuppressant  -patient is also thrombocytopenic due to immunosuppression.   -CT showed gallbladder wall thickening with pericholecystic fluid. There are inflammatory changes in the right upper quadrant  consistent with cholecystitis   -patient's HIDA scan yesterday showed no indication of cholecystitis.  -patient is currently hemodynamically stable  -possibly due to constipation.   -surgery team said surgery was not necessary at this point.   -patient has not had a BM for 4 days so this is most likely due to constipation again     Plan:   -no need for surgery right now and just control pain   -aggressive BM regimen   -naproxen 500 mg BID and tylenol 1g TID for pain   -we can stop zosyn for now as no sign of infection or fever with stable vitals  -continue to monitor CBC and vitals for neutropenic fever  -Patient tolerated food and fluid well last night so no need to be on IV     Multiple myeloma/stem cell transplant  Assessment and Plan:      -Patient had stem cell transplant 2 years ago  -currently with oncologist Dorcas Covarrubias MD   -patient temporarily off chemo med during stay   -patient only experiences constipation from the medication and no other adverse effects   -RUQ seems to be more related to medication         Plan:   -will call oncologist   -Continue with current chemo regimen   -request patient record from his doctors   -f/u with Dr. Covarrubias and PCP        Chronic medical condition:   -HTN: stable and normotensive not controlled with medication   -DVT prophylaxis with SCD and encourage ambulation

## 2020-07-29 NOTE — PROGRESS NOTES
Assumed pt care at 0700. Received report from James THORNTON. A&O x4. Pt c/o 5/10 abd MD dinah at bedside would like to try NSAIDs, new order received, will administer once available. Respirations even and unlabored on RA.   Updated on POC, communication board updated. Pt's girlfriend/caregiver contacted this RN, will be visiting with pt this morning and would like to see MD once here, [facility] resident aware, states will be here within 2-3 hours. Bed locked and in lowest position. Call light and belongings within reach. Non-skid socks in place. Needs met, will continue to monitor.

## 2020-07-29 NOTE — DISCHARGE INSTRUCTIONS
Discharge Instructions    Discharged to home by car with friend. Discharged via wheelchair, hospital escort: Yes.  Special equipment needed: Not Applicable    Be sure to schedule a follow-up appointment with your primary care doctor or any specialists as instructed.     Discharge Plan:   Diet Plan: Discussed  Activity Level: Discussed  Confirmed Follow up Appointment: Patient to Call and Schedule Appointment  Confirmed Symptoms Management: Discussed  Medication Reconciliation Updated: Yes    I understand that a diet low in cholesterol, fat, and sodium is recommended for good health. Unless I have been given specific instructions below for another diet, I accept this instruction as my diet prescription.   Other diet: Heart healthy    Special Instructions: None    · Is patient discharged on Warfarin / Coumadin?   No     Depression / Suicide Risk    As you are discharged from this [facility] facility, it is important to learn how to keep safe from harming yourself.    Recognize the warning signs:  · Abrupt changes in personality, positive or negative- including increase in energy   · Giving away possessions  · Change in eating patterns- significant weight changes-  positive or negative  · Change in sleeping patterns- unable to sleep or sleeping all the time   · Unwillingness or inability to communicate  · Depression  · Unusual sadness, discouragement and loneliness  · Talk of wanting to die  · Neglect of personal appearance   · Rebelliousness- reckless behavior  · Withdrawal from people/activities they love  · Confusion- inability to concentrate     If you or a loved one observes any of these behaviors or has concerns about self-harm, here's what you can do:  · Talk about it- your feelings and reasons for harming yourself  · Remove any means that you might use to hurt yourself (examples: pills, rope, extension cords, firearm)  · Get professional help from the community (Mental Health, Substance Abuse, psychological  counseling)  · Do not be alone:Call your Safe Contact- someone whom you trust who will be there for you.  · Call your local CRISIS HOTLINE 465-9038 or 261-727-0401  · Call your local Children's Mobile Crisis Response Team [place] (385) 164-9444 or www.[employer]  · Call the toll free National Suicide Prevention Hotlines   · National Suicide Prevention Lifeline 789-990-CQTW (5692)  · National Hope Line Network 800-SUICIDE (417-4432)

## 2020-07-30 LAB — HAPTOGLOB SERPL-MCNC: 124 MG/DL (ref 30–200)

## 2025-01-21 ENCOUNTER — OFFICE VISIT (OUTPATIENT)
Dept: MEDICAL GROUP | Age: 75
End: 2025-01-21
Payer: MEDICARE

## 2025-01-21 VITALS
DIASTOLIC BLOOD PRESSURE: 52 MMHG | OXYGEN SATURATION: 98 % | HEART RATE: 66 BPM | WEIGHT: 150 LBS | HEIGHT: 64 IN | BODY MASS INDEX: 25.61 KG/M2 | SYSTOLIC BLOOD PRESSURE: 108 MMHG | TEMPERATURE: 97.9 F

## 2025-01-21 DIAGNOSIS — R73.01 IMPAIRED FASTING BLOOD SUGAR: ICD-10-CM

## 2025-01-21 DIAGNOSIS — R53.83 FATIGUE, UNSPECIFIED TYPE: ICD-10-CM

## 2025-01-21 DIAGNOSIS — E55.9 VITAMIN D INSUFFICIENCY: ICD-10-CM

## 2025-01-21 DIAGNOSIS — C90.01 MULTIPLE MYELOMA IN REMISSION (HCC): ICD-10-CM

## 2025-01-21 DIAGNOSIS — E78.00 ELEVATED LDL CHOLESTEROL LEVEL: ICD-10-CM

## 2025-01-21 PROCEDURE — 99204 OFFICE O/P NEW MOD 45 MIN: CPT | Performed by: STUDENT IN AN ORGANIZED HEALTH CARE EDUCATION/TRAINING PROGRAM

## 2025-01-21 ASSESSMENT — ENCOUNTER SYMPTOMS
SHORTNESS OF BREATH: 0
ORTHOPNEA: 0
BACK PAIN: 0
DEPRESSION: 0
BRUISES/BLEEDS EASILY: 0
BLURRED VISION: 0
HEADACHES: 0
DIZZINESS: 0
WHEEZING: 0
FEVER: 0
WEAKNESS: 0
DOUBLE VISION: 0
PALPITATIONS: 0
BLOOD IN STOOL: 0
COUGH: 0
ABDOMINAL PAIN: 0
CHILLS: 0

## 2025-01-21 ASSESSMENT — PATIENT HEALTH QUESTIONNAIRE - PHQ9: CLINICAL INTERPRETATION OF PHQ2 SCORE: 0

## 2025-01-21 ASSESSMENT — FIBROSIS 4 INDEX: FIB4 SCORE: 5.09

## 2025-01-22 NOTE — PROGRESS NOTES
Subjective:     CC: Establish care, new patient.     HISTORY OF THE PRESENT ILLNESS:   History of Present Illness  The patient is a 74-year-old male who presents for health maintenance.    He has a history of multiple myeloma, which was managed with stem cell transplantation. He is currently in remission and under the care of a hematologist at [facility], with annual follow-ups. He is due for his next appointment, pending the completion of blood work. A recent appointment was postponed due to scheduling conflicts. The necessary blood work will be completed this week, followed by a teleconference with his hematologist.    He has not undergone a cholesterol panel recently. His testosterone levels have been assessed, but PSA testing has been discontinued due to his age. He has completed two colonoscopies, the most recent of which was conducted approximately 2 years ago and yielded normal results. He was advised that further colonoscopies were not necessary.    He reports a change in his erectile function following his diagnosis of multiple myeloma, despite previously achieving satisfactory erections with half a tablet of Levitra. He hypothesizes that this may be related to alterations in his testosterone levels.    SOCIAL HISTORY  He works as a respiratory therapist at [employer].    MEDICATIONS  Levitra       ROS:   Review of Systems   Constitutional:  Negative for chills, fever and malaise/fatigue.   HENT:  Negative for nosebleeds and tinnitus.    Eyes:  Negative for blurred vision and double vision.   Respiratory:  Negative for cough, shortness of breath and wheezing.    Cardiovascular:  Negative for chest pain, palpitations, orthopnea and leg swelling.   Gastrointestinal:  Negative for abdominal pain, blood in stool and melena.   Genitourinary:  Negative for dysuria and urgency.   Musculoskeletal:  Negative for back pain and joint pain.   Skin:  Negative for rash.   Neurological:  Negative for dizziness, weakness and  "headaches.   Endo/Heme/Allergies:  Does not bruise/bleed easily.   Psychiatric/Behavioral:  Negative for depression and suicidal ideas.          Objective:     Exam: /52 (BP Location: Right arm, Patient Position: Sitting, BP Cuff Size: Adult)   Pulse 66   Temp 36.6 °C (97.9 °F) (Temporal)   Ht 1.626 m (5' 4\")   Wt 68 kg (150 lb)   SpO2 98%  Body mass index is 25.75 kg/m².    Physical Exam  Vitals reviewed.   Constitutional:       General: He is not in acute distress.  HENT:      Head: Normocephalic and atraumatic.      Nose: No congestion.      Mouth/Throat:      Mouth: Mucous membranes are moist.      Pharynx: No oropharyngeal exudate or posterior oropharyngeal erythema.   Eyes:      General: No scleral icterus.     Extraocular Movements: Extraocular movements intact.      Pupils: Pupils are equal, round, and reactive to light.   Cardiovascular:      Rate and Rhythm: Normal rate and regular rhythm.      Pulses: Normal pulses.      Heart sounds: Normal heart sounds. No murmur heard.  Pulmonary:      Effort: Pulmonary effort is normal. No respiratory distress.      Breath sounds: Normal breath sounds. No wheezing.   Abdominal:      General: Bowel sounds are normal. There is no distension.      Palpations: Abdomen is soft.      Tenderness: There is no abdominal tenderness. There is no guarding or rebound.   Musculoskeletal:      Cervical back: Normal range of motion and neck supple.      Right lower leg: No edema.      Left lower leg: No edema.   Skin:     General: Skin is warm.      Capillary Refill: Capillary refill takes less than 2 seconds.      Coloration: Skin is not jaundiced.      Findings: No bruising or erythema.   Neurological:      General: No focal deficit present.      Mental Status: He is alert.      Cranial Nerves: No cranial nerve deficit.      Sensory: No sensory deficit.   Psychiatric:         Mood and Affect: Mood normal.         Behavior: Behavior normal.       Labs: " Reviewed    Assessment & Plan:   74 y.o. male with the following -    1. Multiple myeloma in remission (HCC)  He is currently in remission and follows up with his hematologist at least once a year. He is due for a follow-up and needs to complete blood work for his hematologist to review.    2. Elevated LDL cholesterol level  -History of elevated LDL cholesterol  -Repeat lipid profile to correlate ASCVD score  - Lipid Profile; Future  - TSH; Future    3. Fatigue, unspecified type  -History of unspecified fatigue  -I will check testosterone levels  - TESTOSTERONE SERUM; Future    4. Vitamin D insufficiency  -Screening for vitamin D insufficiency/deficiency  - VITAMIN D,25 HYDROXY (DEFICIENCY); Future    5. Impaired fasting blood sugar  -Screening for prediabetes  - HEMOGLOBIN A1C; Future     Erectile dysfunction.  He reports a decrease in erectile function, possibly related to his previous multiple myeloma treatment. He used to use Levitra successfully but has not been as successful recently. Testosterone levels will be checked as part of the lab work.    Health maintenance.  He is up to date with all his screenings, including colonoscopy, which was done 2 years ago and was normal. A comprehensive set of laboratory tests will be ordered, including a cholesterol panel, vitamin D level, A1c, TSH, and testosterone. He will complete these tests at [place].      Return in about 6 months (around 7/21/2025) for Labs.    Please note that this dictation was created using voice recognition software. I have made every reasonable attempt to correct obvious errors, but I expect that there are errors of grammar and possibly content that I did not discover before finalizing the note.

## 2025-01-22 NOTE — PATIENT INSTRUCTIONS
-Continue home meds  -Follow up with Hematologist  -Blood work within the next 3-6 months  -Follow up with me every 6-12 months

## 2025-02-20 ENCOUNTER — TELEPHONE (OUTPATIENT)
Dept: MEDICAL GROUP | Age: 75
End: 2025-02-20

## 2025-02-20 ENCOUNTER — OFFICE VISIT (OUTPATIENT)
Dept: MEDICAL GROUP | Age: 75
End: 2025-02-20
Payer: MEDICARE

## 2025-02-20 VITALS
OXYGEN SATURATION: 97 % | WEIGHT: 150 LBS | TEMPERATURE: 99.2 F | SYSTOLIC BLOOD PRESSURE: 130 MMHG | HEART RATE: 64 BPM | HEIGHT: 64 IN | DIASTOLIC BLOOD PRESSURE: 68 MMHG | BODY MASS INDEX: 25.61 KG/M2

## 2025-02-20 DIAGNOSIS — J06.9 UPPER RESPIRATORY TRACT INFECTION, UNSPECIFIED TYPE: ICD-10-CM

## 2025-02-20 DIAGNOSIS — E78.2 MIXED DYSLIPIDEMIA: ICD-10-CM

## 2025-02-20 LAB
FLUAV RNA SPEC QL NAA+PROBE: NEGATIVE
FLUBV RNA SPEC QL NAA+PROBE: NEGATIVE
RSV RNA SPEC QL NAA+PROBE: NEGATIVE
SARS-COV-2 RNA RESP QL NAA+PROBE: NEGATIVE

## 2025-02-20 PROCEDURE — 3075F SYST BP GE 130 - 139MM HG: CPT | Performed by: STUDENT IN AN ORGANIZED HEALTH CARE EDUCATION/TRAINING PROGRAM

## 2025-02-20 PROCEDURE — 0241U POCT CEPHEID COV-2, FLU A/B, RSV - PCR: CPT | Performed by: STUDENT IN AN ORGANIZED HEALTH CARE EDUCATION/TRAINING PROGRAM

## 2025-02-20 PROCEDURE — 3078F DIAST BP <80 MM HG: CPT | Performed by: STUDENT IN AN ORGANIZED HEALTH CARE EDUCATION/TRAINING PROGRAM

## 2025-02-20 PROCEDURE — 99214 OFFICE O/P EST MOD 30 MIN: CPT | Performed by: STUDENT IN AN ORGANIZED HEALTH CARE EDUCATION/TRAINING PROGRAM

## 2025-02-20 ASSESSMENT — ENCOUNTER SYMPTOMS
CHILLS: 0
SHORTNESS OF BREATH: 0
SORE THROAT: 1
ORTHOPNEA: 0
DEPRESSION: 0
PALPITATIONS: 0
DIZZINESS: 0
SINUS PAIN: 0
COUGH: 1
DOUBLE VISION: 0
HEMOPTYSIS: 0
WHEEZING: 0
BLURRED VISION: 0
BRUISES/BLEEDS EASILY: 0
HEADACHES: 0
ABDOMINAL PAIN: 0
BACK PAIN: 0
FEVER: 0
WEAKNESS: 0
BLOOD IN STOOL: 0

## 2025-02-20 ASSESSMENT — FIBROSIS 4 INDEX: FIB4 SCORE: 2.89

## 2025-02-20 NOTE — PROGRESS NOTES
Subjective:     CC: Flulike symptoms    HPI:   History of Present Illness  The patient is a 75-year-old male presenting for flu-like symptoms.    He received his RSV vaccine approximately 3 weeks ago, after which he experienced mild rhinorrhea. Over the past weekend, he was in contact with children who had clear nasal discharge. He began to exhibit symptoms of coughing up green phlegm last night and has been feeling generally unwell, with frequent moaning. He reports no myalgia. He has been managing his symptoms with Mucinex, taking two doses last night and two this morning, which provided some relief. He also finds Advil Cold and Sinus to be particularly effective. He has received his influenza vaccine this year. He recalls a similar episode of illness on 12/20/2024, characterized by coughing, which was managed with a Z-Iggy prescribed at [facility]. He does not report any current wheezing.    Supplemental Information  He has a history of ear infections and has been resistant to seeing an ENT specialist, opting instead to self-manage by removing earwax.    MEDICATIONS  Mucinex, Advil Cold and Sinus    IMMUNIZATIONS  He has received his RSV vaccine and influenza vaccine this year.       ROS:  Review of Systems   Constitutional:  Negative for chills, fever and malaise/fatigue.   HENT:  Positive for congestion and sore throat. Negative for ear discharge, ear pain, nosebleeds, sinus pain and tinnitus.    Eyes:  Negative for blurred vision and double vision.   Respiratory:  Positive for cough. Negative for hemoptysis, shortness of breath and wheezing.    Cardiovascular:  Negative for chest pain, palpitations, orthopnea and leg swelling.   Gastrointestinal:  Negative for abdominal pain, blood in stool and melena.   Genitourinary:  Negative for dysuria, frequency and urgency.   Musculoskeletal:  Negative for back pain and joint pain.   Skin:  Negative for rash.   Neurological:  Negative for dizziness, weakness and  "headaches.   Endo/Heme/Allergies:  Does not bruise/bleed easily.   Psychiatric/Behavioral:  Negative for depression and suicidal ideas.        Objective:     Exam:  /68 (BP Location: Left arm, Patient Position: Sitting, BP Cuff Size: Adult)   Pulse 64   Temp 37.3 °C (99.2 °F) (Temporal)   Ht 1.626 m (5' 4\")   Wt 68 kg (150 lb)   SpO2 97%   BMI 25.75 kg/m²  Body mass index is 25.75 kg/m².    Physical Exam  Vitals reviewed.   Constitutional:       General: He is not in acute distress.  HENT:      Head: Normocephalic and atraumatic.      Nose: No congestion.      Mouth/Throat:      Mouth: Mucous membranes are moist.      Pharynx: No oropharyngeal exudate or posterior oropharyngeal erythema.   Eyes:      General: No scleral icterus.     Extraocular Movements: Extraocular movements intact.      Pupils: Pupils are equal, round, and reactive to light.   Cardiovascular:      Rate and Rhythm: Normal rate and regular rhythm.      Pulses: Normal pulses.      Heart sounds: Normal heart sounds. No murmur heard.  Pulmonary:      Effort: Pulmonary effort is normal. No respiratory distress.      Breath sounds: Normal breath sounds. No wheezing.   Abdominal:      General: Bowel sounds are normal. There is no distension.      Palpations: Abdomen is soft.      Tenderness: There is no abdominal tenderness. There is no guarding or rebound.   Musculoskeletal:      Cervical back: Normal range of motion and neck supple.      Right lower leg: No edema.      Left lower leg: No edema.   Skin:     General: Skin is warm.      Capillary Refill: Capillary refill takes less than 2 seconds.      Coloration: Skin is not jaundiced.      Findings: No bruising or erythema.   Neurological:      General: No focal deficit present.      Mental Status: He is alert.      Cranial Nerves: No cranial nerve deficit.      Sensory: No sensory deficit.   Psychiatric:         Mood and Affect: Mood normal.         Behavior: Behavior normal.       Labs: " None    Assessment & Plan:     75 y.o. male with the following -     1. Upper respiratory tract infection, unspecified type  He reports cold-like symptoms, including a runny nose and cough with green phlegm, which started after exposure to family members with similar symptoms. He has been taking Mucinex and Advil Cold and Sinus, which have provided some relief. On exam, there is no evidence of pneumonia or strep throat. An influenza swab test will be conducted today. He is advised to continue using Mucinex and may also consider Robitussin Extra Strength for symptom management. If the influenza test returns positive, a prescription for Tamiflu will be issued.  - POCT CoV-2, Flu A/B, RSV by PCR    2. Mixed dyslipidemia  -Chronic, stable  -Managed with diet and exercise  -Currently profile showed total cholesterol of 252 and LDL cholesterol of 152  -Recommend to continue stretching regularly, DASH diet or plant-based diet    Return if symptoms worsen or fail to improve.    Please note that this dictation was created using voice recognition software. I have made every reasonable attempt to correct obvious errors, but I expect that there are errors of grammar and possibly content that I did not discover before finalizing the note.

## 2025-02-21 NOTE — TELEPHONE ENCOUNTER
I called Mathew to let him know about Negative swab test.  He will continue with symptomatic treatment and let me know if not significant improvement by next week.